# Patient Record
Sex: FEMALE | Race: WHITE | NOT HISPANIC OR LATINO | Employment: FULL TIME | URBAN - METROPOLITAN AREA
[De-identification: names, ages, dates, MRNs, and addresses within clinical notes are randomized per-mention and may not be internally consistent; named-entity substitution may affect disease eponyms.]

---

## 2018-06-21 PROBLEM — M51.369 DDD (DEGENERATIVE DISC DISEASE), LUMBAR: Status: ACTIVE | Noted: 2018-06-21

## 2018-07-27 PROBLEM — M51.369 BULGE OF LUMBAR DISC WITHOUT MYELOPATHY: Status: ACTIVE | Noted: 2018-07-27

## 2021-04-03 PROBLEM — E66.812 CLASS 2 SEVERE OBESITY WITH SERIOUS COMORBIDITY AND BODY MASS INDEX (BMI) OF 37.0 TO 37.9 IN ADULT: Status: ACTIVE | Noted: 2021-03-22

## 2021-04-03 PROBLEM — M51.369 BULGE OF LUMBAR DISC WITHOUT MYELOPATHY: Status: RESOLVED | Noted: 2018-07-27 | Resolved: 2021-04-03

## 2024-03-13 ENCOUNTER — E-VISIT (OUTPATIENT)
Dept: ADMINISTRATIVE | Facility: OTHER | Age: 49
End: 2024-03-13
Payer: COMMERCIAL

## 2024-03-14 ENCOUNTER — HOSPITAL ENCOUNTER (OUTPATIENT)
Dept: GENERAL RADIOLOGY | Facility: HOSPITAL | Age: 49
Discharge: HOME | End: 2024-03-14
Admitting: SURGERY
Payer: COMMERCIAL

## 2024-03-14 PROCEDURE — 74220 X-RAY XM ESOPHAGUS 1CNTRST: CPT | Performed by: RADIOLOGY

## 2024-03-14 PROCEDURE — 74220 X-RAY XM ESOPHAGUS 1CNTRST: CPT

## 2024-03-19 ENCOUNTER — OFFICE VISIT (OUTPATIENT)
Dept: SURGERY | Facility: CLINIC | Age: 49
End: 2024-03-19
Payer: COMMERCIAL

## 2024-03-19 VITALS — WEIGHT: 246 LBS | HEIGHT: 65 IN | BODY MASS INDEX: 40.98 KG/M2

## 2024-03-19 DIAGNOSIS — Z90.3 HISTORY OF SLEEVE GASTRECTOMY: ICD-10-CM

## 2024-03-19 DIAGNOSIS — K21.9 GASTROESOPHAGEAL REFLUX DISEASE WITHOUT ESOPHAGITIS: Primary | ICD-10-CM

## 2024-03-19 PROCEDURE — 99212 OFFICE O/P EST SF 10 MIN: CPT | Performed by: SURGERY

## 2024-03-19 RX ORDER — SERTRALINE HYDROCHLORIDE 100 MG/1
100 TABLET, FILM COATED ORAL DAILY
Qty: 30 TABLET | Refills: 2 | Status: SHIPPED | OUTPATIENT
Start: 2024-03-19

## 2024-03-20 NOTE — PROGRESS NOTES
Subjective   Briana Ugarte is a 48 y.o. female  is here today for follow-up.         Briana Ugarte is a 48 y.o. female here for follow up after EGD.  The patient was noted to have intact sleeve gastrectomy without ulceration.  Small hiatal hernia noted.  Reflux persist despite medical management.    Physical Exam  No acute distress, alert and oriented x 3  Clear to auscultation bilaterally  Abdomen soft nontender nondistended            Assessment     Diagnoses and all orders for this visit:    1. Gastroesophageal reflux disease without esophagitis (Primary)    2. History of sleeve gastrectomy      Briana Ugarte is a 48 y.o. female with history of sleeve gastrectomy and a small hiatal hernia that is present with reflux symptomatology despite medical management.  Patient has been counseled on lifestyle modification and weight loss, body mass index below 40.  The patient will attempt these measures to see if symptoms improve but if no improvement is noted patient will return to her bariatric surgeon for possible repair.

## 2024-04-01 ENCOUNTER — OFFICE VISIT (OUTPATIENT)
Dept: FAMILY MEDICINE CLINIC | Facility: CLINIC | Age: 49
End: 2024-04-01
Payer: COMMERCIAL

## 2024-04-01 VITALS
WEIGHT: 234 LBS | HEART RATE: 99 BPM | DIASTOLIC BLOOD PRESSURE: 64 MMHG | SYSTOLIC BLOOD PRESSURE: 126 MMHG | TEMPERATURE: 98.9 F | RESPIRATION RATE: 14 BRPM | HEIGHT: 65 IN | BODY MASS INDEX: 38.99 KG/M2 | OXYGEN SATURATION: 99 %

## 2024-04-01 DIAGNOSIS — K21.9 GASTROESOPHAGEAL REFLUX DISEASE WITHOUT ESOPHAGITIS: ICD-10-CM

## 2024-04-01 DIAGNOSIS — E66.01 CLASS 2 SEVERE OBESITY WITH SERIOUS COMORBIDITY AND BODY MASS INDEX (BMI) OF 39.0 TO 39.9 IN ADULT, UNSPECIFIED OBESITY TYPE: ICD-10-CM

## 2024-04-01 DIAGNOSIS — Z17.0 MALIGNANT NEOPLASM OF LEFT BREAST IN FEMALE, ESTROGEN RECEPTOR POSITIVE, UNSPECIFIED SITE OF BREAST: ICD-10-CM

## 2024-04-01 DIAGNOSIS — K76.0 NAFLD (NONALCOHOLIC FATTY LIVER DISEASE): ICD-10-CM

## 2024-04-01 DIAGNOSIS — Z00.00 HEALTHCARE MAINTENANCE: ICD-10-CM

## 2024-04-01 DIAGNOSIS — C50.912 MALIGNANT NEOPLASM OF LEFT BREAST IN FEMALE, ESTROGEN RECEPTOR POSITIVE, UNSPECIFIED SITE OF BREAST: ICD-10-CM

## 2024-04-01 DIAGNOSIS — Z90.3 HISTORY OF SLEEVE GASTRECTOMY: ICD-10-CM

## 2024-04-01 DIAGNOSIS — F41.8 ANXIETY WITH DEPRESSION: Chronic | ICD-10-CM

## 2024-04-01 DIAGNOSIS — M15.9 GENERALIZED OSTEOARTHROSIS, INVOLVING MULTIPLE SITES: ICD-10-CM

## 2024-04-01 DIAGNOSIS — M79.7 FIBROMYALGIA: ICD-10-CM

## 2024-04-01 DIAGNOSIS — J06.9 VIRAL UPPER RESPIRATORY INFECTION: ICD-10-CM

## 2024-04-01 DIAGNOSIS — I10 ESSENTIAL HYPERTENSION: Primary | Chronic | ICD-10-CM

## 2024-04-01 RX ORDER — ACETAMINOPHEN AND CODEINE PHOSPHATE 300; 30 MG/1; MG/1
1 TABLET ORAL NIGHTLY PRN
Qty: 10 TABLET | Refills: 0 | Status: SHIPPED | OUTPATIENT
Start: 2024-04-01

## 2024-04-01 NOTE — PROGRESS NOTES
Pt resting in bed comfortably. VSS. Fall precautions in place. Call light within reach. NIH is 0 throughout the shift. No further needs expressed at this time. Subjective   Briana Ugarte is a 48 y.o. female.     Chief Complaint  Flulike symptoms    History of Present Illness     Flulike Symptoms  She returns with a 3-day history of rhinorrhea, nasal congestion, sore throat, bilateral ear pain worse on the left, cough productive of yellow sputum, mild shortness of breath, mild anterior chest pain with deep breaths or coughing.  The symptoms have been associated with a decreased appetite, intermittent nausea, and chills.  She denies any other upper respiratory tract symptoms, and there is no history of any hemoptysis.  She denies any vomiting, abdominal pain, change in her bowel habits, hematochezia or melena, dysuria or hematuria, rash, or documented fever.  A coworker has been sick with similar symptoms    Gastroesophageal Reflux Disease  She continues to experience frequent heartburn with intermittent dysphagia to solids and liquids.  She continues to deny any divya vomiting or hematemesis, and there is no history of any abdominal pain, change in bowel habits, hematochezia, or melena.  She remains on omeprazole 40 twice daily, and continues to deny any NSAID use. She underwent an EGD by Dr. Clement on 6/2/2022 and was noted to have a small hiatal hernia, along with evidence of previous sleeve gastrectomy.  She underwent a reassessment with him since last here and an esophagram revealed a small sliding hiatal hernia, gastroesophageal reflux to the level of the mid esophagus, and occasional tertiary contractions.  He recommended follow-up with her bariatric surgery, and she will see GISELLE Snell on 5/22/2024.      History of Breast Cancer  She is approximately 3 years post left total mastectomy with a level 1 and 2 axillary lymph node dissection following a positive sentinel node.  3 of 14 lymph nodes were positive.  She was eventually staged as 1A mpT2 pN1a Mo ER+ DC+ HER2 +.  She was treated with adjuvant chemotherapy and radiation therapy and remains on  tamoxifen.  She continues to be followed by  oncology.  She underwent an updated MRI of the abdomen on 5/11/2023 with evidence of moderate hepatic steatosis as well as a number of liver lesions felt to either represent hemangiomas or focal nodular hyperplasia.  She underwent recent  fat grafting with a left nipple reconstruction by  plastic surgery     Recent TLH/BSO  She underwent a TLH/BSO by Dr. Cardona at  on 7/17/2023.  This was uncomplicated and pathology was reported as showing adenomyosis and leiomyomas up to 1 cm in diameter.  With the exception of intermittent hot flashes, she has had no sequela, and is happy with the results.    Migraine Headaches  She has a long history of migraine.  She remains on ajovy with botox injections and reports a continued improvement in their frequency and severity.  There has been no change in the quality nor any new associated symptoms. She continues to deny any changes in her vision, strength, or sensation.  She is using ubrelvy as needed and states she is generally functional within several hours    Chronic Low Back Pain  She underwent a right L5-S1 microlumbar discectomy by Dr. Kauffman on 7/6/2022.  This was uncomplicated, and while she continues to have low back pain, has had no further leg pain.  She continues to be followed by pain management    Fibromyalgia  She gives a long history of generalized muscle pain, difficulty sleeping, and fatigue.  There is no history of any joint stiffness, swelling, or redness and she continues to deny any rash.    Hypothyroidism  She remains on levothyroxine 50 daily.  She admits to fatigue.      Chronic Anxiety with Depression  She gives a long history of intermittent nervousness, worrying, difficulty sleeping.  When severe her symptoms have been associated with depression.  She has done better with the increased dose of escitalopram.  She continues to deny any side effects, and there is no history of any loss of interest in  activities or suicidal ideation    The following portions of the patient's history were reviewed and updated as appropriate: allergies, current medications, past medical history, past social history, and problem list.    Review of Systems   Constitutional:  Positive for chills and fatigue. Negative for fever.   HENT:  Positive for congestion, ear pain, postnasal drip, rhinorrhea and sore throat.    Eyes:  Negative for visual disturbance.   Respiratory:  Positive for cough and shortness of breath. Negative for wheezing.    Cardiovascular:  Positive for chest pain. Negative for palpitations and leg swelling.   Gastrointestinal:  Positive for nausea and GERD. Negative for abdominal pain, blood in stool, constipation, diarrhea and vomiting.        Intermittent dysphagia   Endocrine:        Hot flashes   Genitourinary:  Negative for dysuria and hematuria.   Musculoskeletal:  Positive for arthralgias, back pain, gait problem and myalgias. Negative for joint swelling.   Skin:  Negative for rash.   Neurological:  Positive for weakness (generalized), numbness and headache. Negative for dizziness.   Psychiatric/Behavioral:  Positive for sleep disturbance. Negative for suicidal ideas and depressed mood. The patient is nervous/anxious.      Objective   Physical Exam  Constitutional:       General: She is not in acute distress.     Appearance: Normal appearance. She is well-developed. She is not diaphoretic.      Comments: Alert and in fair spirits. No apparent distress. No pallor, jaundice, diaphoresis, or cyanosis.   HENT:      Head: Atraumatic.      Right Ear: Tympanic membrane, ear canal and external ear normal.      Left Ear: Tympanic membrane, ear canal and external ear normal.      Mouth/Throat:      Lips: No lesions.      Mouth: Mucous membranes are moist. No oral lesions.      Pharynx: No oropharyngeal exudate or posterior oropharyngeal erythema.   Eyes:      General: Lids are normal.      Extraocular Movements:  Extraocular movements intact.      Conjunctiva/sclera: Conjunctivae normal.      Pupils: Pupils are equal.   Neck:      Thyroid: No thyroid mass or thyromegaly.      Vascular: No carotid bruit or JVD.      Trachea: Trachea normal. No tracheal deviation.   Cardiovascular:      Rate and Rhythm: Normal rate and regular rhythm.      Heart sounds: Normal heart sounds, S1 normal and S2 normal. No murmur heard.     No gallop.   Pulmonary:      Effort: Pulmonary effort is normal.      Breath sounds: Normal breath sounds. No decreased breath sounds, wheezing, rhonchi or rales.   Abdominal:      General: Bowel sounds are normal. There is no distension.   Musculoskeletal:      Right lower leg: No edema.      Left lower leg: No edema.   Lymphadenopathy:      Head:      Right side of head: No submental, submandibular, tonsillar, preauricular, posterior auricular or occipital adenopathy.      Left side of head: No submental, submandibular, tonsillar, preauricular, posterior auricular or occipital adenopathy.      Cervical: No cervical adenopathy.      Upper Body:      Right upper body: No supraclavicular adenopathy.      Left upper body: No supraclavicular adenopathy.   Skin:     General: Skin is warm.      Coloration: Skin is not cyanotic, jaundiced or pale.      Findings: No rash.      Nails: There is no clubbing.   Neurological:      Mental Status: She is alert and oriented to person, place, and time.      Cranial Nerves: No cranial nerve deficit, dysarthria or facial asymmetry.      Sensory: No sensory deficit.      Motor: No tremor.      Coordination: Coordination normal.      Gait: Gait normal.   Psychiatric:         Attention and Perception: Attention normal.         Mood and Affect: Mood normal.         Speech: Speech normal.         Behavior: Behavior normal.         Thought Content: Thought content normal.       Assessment & Plan   Problems Addressed this Visit          Cardiac and Vasculature    Essential  hypertension   Hypertension:  BP remains at goal off medication . Evidence of target organ damage: none.  Encouraged to continue to work on diet and exercise plan.        ENT    Viral upper respiratory infection  Advised regarding symptomatic treatment.  Agreed on acetaminophen with codeine at night.  Patient will let her pain management doctor know  Patient did not feel she would start on antiviral therapy even if positive for influenza or COVID  Encouraged to report if any worse, any new symptoms, or if not resolving over the next 4 days    Relevant Medications    acetaminophen-codeine (TYLENOL with CODEINE #3) 300-30 MG per tablet       Endocrine and Metabolic    Class 2 severe obesity with serious comorbidity and body mass index (BMI) of 39.0 to 39.9 in adult    History of sleeve gastrectomy       Gastrointestinal Abdominal     Gastroesophageal reflux disease without esophagitis   Symptoms are currently  much worse  Reminded regarding lifestyle modification.  Continue current medication.  Follow up with bariatric surgery    NAFLD (nonalcoholic fatty liver disease)       Hematology and Neoplasia   Malignant neoplasm of left breast in female, estrogen receptor positive  Follow up with oncology   Mental Health   Anxiety with depression (Chronic)  Stable.  Supportive therapy.   Continue current medication.  Encouraged to report if any worse or if any new symptoms or concerns.   Musculoskeletal and Injuries   Fibromyalgia   Generalized osteoarthrosis, involving multiple sites  Reminded regarding symptomatic treatment.   Continue current medication  Follow up with pain management                                                 Diagnoses         Codes Comments    Essential hypertension    -  Primary ICD-10-CM: I10  ICD-9-CM: 401.9     History of sleeve gastrectomy     ICD-10-CM: Z90.3  ICD-9-CM: V15.29     Class 2 severe obesity with serious comorbidity and body mass index (BMI) of 39.0 to 39.9 in adult, unspecified  obesity type     ICD-10-CM: E66.01, Z68.39  ICD-9-CM: 278.01, V85.39     NAFLD (nonalcoholic fatty liver disease)     ICD-10-CM: K76.0  ICD-9-CM: 571.8     Gastroesophageal reflux disease without esophagitis     ICD-10-CM: K21.9  ICD-9-CM: 530.81     Healthcare maintenance     ICD-10-CM: Z00.00  ICD-9-CM: V70.0     Malignant neoplasm of left breast in female, estrogen receptor positive, unspecified site of breast     ICD-10-CM: C50.912, Z17.0  ICD-9-CM: 174.9, V86.0     Anxiety with depression     ICD-10-CM: F41.8  ICD-9-CM: 300.4     Generalized osteoarthrosis, involving multiple sites     ICD-10-CM: M15.9  ICD-9-CM: 715.09     Fibromyalgia     ICD-10-CM: M79.7  ICD-9-CM: 729.1     Viral upper respiratory infection     ICD-10-CM: J06.9  ICD-9-CM: 465.9

## 2024-04-05 DIAGNOSIS — N95.1 MENOPAUSAL SYMPTOM: Primary | ICD-10-CM

## 2024-04-05 RX ORDER — FEZOLINETANT 45 MG/1
45 TABLET, FILM COATED ORAL EVERY 24 HOURS
Qty: 30 TABLET | Refills: 5 | Status: SHIPPED | OUTPATIENT
Start: 2024-04-05

## 2024-04-17 ENCOUNTER — OFFICE VISIT (OUTPATIENT)
Dept: FAMILY MEDICINE CLINIC | Facility: CLINIC | Age: 49
End: 2024-04-17
Payer: COMMERCIAL

## 2024-04-17 VITALS
DIASTOLIC BLOOD PRESSURE: 80 MMHG | WEIGHT: 239 LBS | SYSTOLIC BLOOD PRESSURE: 130 MMHG | TEMPERATURE: 97 F | BODY MASS INDEX: 39.82 KG/M2 | OXYGEN SATURATION: 98 % | HEART RATE: 81 BPM | HEIGHT: 65 IN

## 2024-04-17 DIAGNOSIS — G43.909 MIGRAINE WITHOUT STATUS MIGRAINOSUS, NOT INTRACTABLE, UNSPECIFIED MIGRAINE TYPE: Primary | ICD-10-CM

## 2024-04-17 RX ORDER — KETOROLAC TROMETHAMINE 10 MG/1
10 TABLET, FILM COATED ORAL EVERY 6 HOURS PRN
Qty: 16 TABLET | Refills: 0 | Status: SHIPPED | OUTPATIENT
Start: 2024-04-17 | End: 2024-04-21

## 2024-04-17 RX ORDER — KETOROLAC TROMETHAMINE 30 MG/ML
60 INJECTION, SOLUTION INTRAMUSCULAR; INTRAVENOUS ONCE
Status: COMPLETED | OUTPATIENT
Start: 2024-04-17 | End: 2024-04-17

## 2024-04-17 RX ORDER — PROMETHAZINE HYDROCHLORIDE 12.5 MG/1
12.5 TABLET ORAL EVERY 6 HOURS PRN
Qty: 30 TABLET | Refills: 5 | Status: SHIPPED | OUTPATIENT
Start: 2024-04-17

## 2024-04-17 RX ADMIN — KETOROLAC TROMETHAMINE 60 MG: 30 INJECTION, SOLUTION INTRAMUSCULAR; INTRAVENOUS at 13:15

## 2024-04-17 NOTE — PROGRESS NOTES
Subjective        Chief Complaint  Back Pain and Headache    Subjective      Briana Ugarte is a 48 y.o. female who presents today to [facility] for Back Pain and Headache. She has a past medical history of migraine headaches, Anxiety, Breast cancer (1/24/2018), Cervical disc disorder, Cholelithiasis, Chronic back pain, Essential hypertension, Fibromyalgia, primary, GERD, Hypothyroidism, Joint pain, Kidney stones, and DDD.     Back Pain and Headache:  She reports 2 days of migraine type headache. She has these chronically and it is similar to previous episodes. She has had associated nausea. She is on monthly Ajovy injections, botox injections, and prn Ubrelvy. The Ubrelvy generally helps when she does get a break through migraine, but has not helped with this episode. She has also had a flare of her chronic back pain with known DDD.      Current Outpatient Medications:     cetirizine (zyrTEC) 10 MG tablet, Take 1 tablet by mouth Daily., Disp: 30 tablet, Rfl: 0    cyanocobalamin 1000 MCG/ML injection, ADMINISTER 1 ML IN THE MUSCLE EVERY 28 DAYS AS DIRECTED BY PRESCRIBER, Disp: 1 mL, Rfl: 5    cyclobenzaprine (FLEXERIL) 10 MG tablet, Take 1 tablet by mouth 3 (Three) Times a Day As Needed for Muscle Spasms., Disp: , Rfl:     Fezolinetant (Veozah) 45 MG tablet, Take 1 tablet by mouth Daily., Disp: 30 tablet, Rfl: 5    Fremanezumab-vfrm (Ajovy) 225 MG/1.5ML solution auto-injector, Inject 225 mg under the skin into the appropriate area as directed Every 30 (Thirty) Days., Disp: 1.5 mL, Rfl: 5    levothyroxine (SYNTHROID, LEVOTHROID) 75 MCG tablet, Take 1 tablet by mouth Daily., Disp: 30 tablet, Rfl: 5    omeprazole (priLOSEC) 40 MG capsule, Take 1 capsule by mouth 2 (Two) Times a Day., Disp: 180 capsule, Rfl: 3    oxyCODONE-acetaminophen (PERCOCET) 7.5-325 MG per tablet, Take 1 tablet by mouth Every 6 (Six) Hours As Needed for Moderate Pain ., Disp: 25 tablet, Rfl: 0     "pregabalin (LYRICA) 200 MG capsule, 3 (Three) Times a Day., Disp: , Rfl:     sertraline (ZOLOFT) 100 MG tablet, TAKE 1 TABLET BY MOUTH DAILY, Disp: 30 tablet, Rfl: 2    tamoxifen (NOLVADEX) 20 MG chemo tablet, Take 1 tablet by mouth Daily., Disp: , Rfl:     ubrogepant (Ubrelvy) 100 MG tablet, Take 1 tablet by mouth 1 (One) Time As Needed (Headache) for up to 1 dose., Disp: 8 tablet, Rfl: 5    vitamin D (ERGOCALCIFEROL) 1.25 MG (81149 UT) capsule capsule, Take 1 capsule by mouth 1 (One) Time Per Week., Disp: 4 capsule, Rfl: 5    ketorolac (TORADOL) 10 MG tablet, Take 1 tablet by mouth Every 6 (Six) Hours As Needed for Moderate Pain for up to 4 days., Disp: 16 tablet, Rfl: 0    mirtazapine (REMERON) 7.5 MG tablet, TAKE 1 TABLET BY MOUTH EVERY NIGHT, Disp: 30 tablet, Rfl: 5    promethazine (PHENERGAN) 12.5 MG tablet, Take 1 tablet by mouth Every 6 (Six) Hours As Needed for Nausea or Vomiting., Disp: 30 tablet, Rfl: 5  No current facility-administered medications for this visit.      No Known Allergies    Objective     Objective   Vital Signs:  /80   Pulse 81   Temp 97 °F (36.1 °C) (Temporal)   Ht 165.1 cm (65\")   Wt 108 kg (239 lb)   SpO2 98%   BMI 39.77 kg/m²   Estimated body mass index is 39.77 kg/m² as calculated from the following:    Height as of this encounter: 165.1 cm (65\").    Weight as of this encounter: 108 kg (239 lb).    Past Medical History:   Diagnosis Date    Anxiety     Arthritis     Breast cancer 1/24/2018    Breast mass     Cancer 07/2018    LEFT BREAST    Cervical disc disorder     Cholelithiasis 2011    Removed    Chronic back pain     Essential hypertension 06/23/2016    Fibromyalgia     Fibromyalgia, primary     GERD (gastroesophageal reflux disease)     Hypothyroidism     Joint pain     Kidney stone     Leukocytosis     Low back pain 2005    [facility]    Lumbosacral disc disease     Migraine     Morbid obesity     Neuropathy     Peripheral edema     Thoracic disc " disorder      Past Surgical History:   Procedure Laterality Date    BACK SURGERY  2022    BARIATRIC SURGERY      sleeve gastrectomy    BREAST AUGMENTATION  2022    right    BREAST AUGMENTATION  2022    right    BREAST AUGMENTATION  2022    BREAST AUGMENTATION  2022    BREAST AUGMENTATION  2022    BREAST BIOPSY  left    BREAST SURGERY      left mastectomy with reconstruction and right augmentation     SECTION      x 2    CHOLECYSTECTOMY  2011    COLONOSCOPY N/A 2022    Procedure: COLONOSCOPY;  Surgeon: Bishop Clement MD;  Location:  [facility];  Service: Gastroenterology;  Laterality: N/A;    ENDOSCOPY N/A 2022    Procedure: ESOPHAGOGASTRODUODENOSCOPY WITH ANESTHESIA;  Surgeon: Bishop Clement MD;  Location:  [facility];  Service: Gastroenterology;  Laterality: N/A;    EPIDURAL BLOCK   Spinal Block     Spinal Block    LUMBAR DISCECTOMY Right 2022    Procedure: LUMBAR DISCECTOMY L5-S1 RIGHT;  Surgeon: Tejinder Kauffman MD;  Location:  [facility];  Service: Neurosurgery;  Laterality: Right;    REDUCTION MAMMAPLASTY  2022    Right    TRIGGER POINT INJECTION  Neck and shoulders     WISDOM TOOTH EXTRACTION       Social History     Socioeconomic History    Marital status:     Number of children: 2   Tobacco Use    Smoking status: Never     Passive exposure: Never    Smokeless tobacco: Never    Tobacco comments:     Years ago I tried it.   Vaping Use    Vaping status: Never Used   Substance and Sexual Activity    Alcohol use: No    Drug use: No    Sexual activity: Not Currently     Partners: Male     Birth control/protection: Post-menopausal, Tubal ligation, Hysterectomy, Surgical     Comment: Tubal      Physical Exam  Vitals and nursing note reviewed.   Constitutional:       General: She is not in acute distress.     Appearance: She is well-developed. She is not diaphoretic.   HENT:      Head: Normocephalic and atraumatic.   Eyes:       General: No scleral icterus.        Right eye: No discharge.         Left eye: No discharge.      Conjunctiva/sclera: Conjunctivae normal.   Cardiovascular:      Rate and Rhythm: Normal rate and regular rhythm.      Heart sounds: Normal heart sounds. No murmur heard.     No friction rub. No gallop.   Pulmonary:      Effort: Pulmonary effort is normal. No respiratory distress.      Breath sounds: Normal breath sounds. No wheezing or rales.   Chest:      Chest wall: No tenderness.   Musculoskeletal:         General: Normal range of motion.      Cervical back: Normal range of motion and neck supple.   Skin:     General: Skin is warm and dry.      Coloration: Skin is not pale.      Findings: No erythema or rash.   Neurological:      Mental Status: She is alert and oriented to person, place, and time.   Psychiatric:         Behavior: Behavior normal.        Result Review :  The following data was reviewed by: GISELLE Franklin on 04/17/2024:  Hemoglobin A1C   Date Value Ref Range Status   07/05/2022 5.60 4.80 - 5.60 % Final     TSH   Date Value Ref Range Status   10/13/2023 2.790 0.270 - 4.200 uIU/mL Final     HDL Cholesterol   Date Value Ref Range Status   10/13/2023 76 (H) 40 - 60 mg/dL Final     LDL Cholesterol    Date Value Ref Range Status   10/13/2023 61 0 - 100 mg/dL Final     Triglycerides   Date Value Ref Range Status   10/13/2023 51 0 - 150 mg/dL Final     Total Cholesterol   Date Value Ref Range Status   10/09/2017 170 0 - 200 mg/dL Final     Comment:              Assessment / Plan         Assessment   Diagnoses and all orders for this visit:    1. Migraine without status migrainosus, not intractable, unspecified migraine type (Primary)  She received a dose of IM ketorolac 60mg x1 in the office today.   Follow with PO ketorolac 10mg QID PRN starting from tomorrow if headache persists or returns.   PRN phenergan sent to pharmacy.   Return to clinic if no improvement noted or if symptoms are worsening.   -      ketorolac (TORADOL) injection 60 mg  -     promethazine (PHENERGAN) 12.5 MG tablet; Take 1 tablet by mouth Every 6 (Six) Hours As Needed for Nausea or Vomiting.  Dispense: 30 tablet; Refill: 5  -     ketorolac (TORADOL) 10 MG tablet; Take 1 tablet by mouth Every 6 (Six) Hours As Needed for Moderate Pain for up to 4 days.  Dispense: 16 tablet; Refill: 0       New Medications Ordered This Visit   Medications    promethazine (PHENERGAN) 12.5 MG tablet     Sig: Take 1 tablet by mouth Every 6 (Six) Hours As Needed for Nausea or Vomiting.     Dispense:  30 tablet     Refill:  5    ketorolac (TORADOL) injection 60 mg    ketorolac (TORADOL) 10 MG tablet     Sig: Take 1 tablet by mouth Every 6 (Six) Hours As Needed for Moderate Pain for up to 4 days.     Dispense:  16 tablet     Refill:  0     Follow Up   Return for Follow up with PCP as scheduled.    Patient was given instructions and counseling regarding her condition or for health maintenance advice. Please see specific information pulled into the AVS if appropriate.       This document has been electronically signed by GISELLE Franklin   April 17, 2024 13:59 EDT    Dictated Utilizing Dragon Dictation: Part of this note may be an electronic transcription/translation of spoken language to printed text using the Dragon Dictation System.

## 2024-05-06 DIAGNOSIS — Z00.00 HEALTHCARE MAINTENANCE: ICD-10-CM

## 2024-05-06 DIAGNOSIS — C50.912 MALIGNANT NEOPLASM OF LEFT BREAST IN FEMALE, ESTROGEN RECEPTOR POSITIVE, UNSPECIFIED SITE OF BREAST: Primary | ICD-10-CM

## 2024-05-06 DIAGNOSIS — Z17.0 MALIGNANT NEOPLASM OF LEFT BREAST IN FEMALE, ESTROGEN RECEPTOR POSITIVE, UNSPECIFIED SITE OF BREAST: Primary | ICD-10-CM

## 2024-05-07 ENCOUNTER — TELEPHONE (OUTPATIENT)
Dept: FAMILY MEDICINE CLINIC | Facility: CLINIC | Age: 49
End: 2024-05-07

## 2024-05-07 NOTE — TELEPHONE ENCOUNTER
Called and spoke to patient.   Made her aware these had already been sent, but I will resend just to be sure

## 2024-05-07 NOTE — TELEPHONE ENCOUNTER
Caller: Briana Ugarte    Relationship: Self    Best call back number: [phone]    What is the medical concern/diagnosis:PT CALLED TO VERIFY IF ULTRASOUND AND MAMMOGRAM HAS BEEN FAXED OVER FAX:[phone] AT TELEPHONE NUMBER: [phone] [facility]

## 2024-05-08 DIAGNOSIS — F41.8 ANXIETY WITH DEPRESSION: Chronic | ICD-10-CM

## 2024-05-08 RX ORDER — MIRTAZAPINE 7.5 MG/1
7.5 TABLET, FILM COATED ORAL NIGHTLY
Qty: 30 TABLET | Refills: 5 | Status: SHIPPED | OUTPATIENT
Start: 2024-05-08

## 2024-05-22 ENCOUNTER — HOSPITAL ENCOUNTER (OUTPATIENT)
Dept: GENERAL RADIOLOGY | Facility: HOSPITAL | Age: 49
Discharge: HOME | End: 2024-05-22
Admitting: NURSE PRACTITIONER
Payer: COMMERCIAL

## 2024-05-22 ENCOUNTER — OFFICE VISIT (OUTPATIENT)
Dept: FAMILY MEDICINE CLINIC | Facility: CLINIC | Age: 49
End: 2024-05-22
Payer: COMMERCIAL

## 2024-05-22 VITALS
BODY MASS INDEX: 40.32 KG/M2 | OXYGEN SATURATION: 97 % | DIASTOLIC BLOOD PRESSURE: 80 MMHG | WEIGHT: 242 LBS | HEART RATE: 107 BPM | HEIGHT: 65 IN | RESPIRATION RATE: 14 BRPM | TEMPERATURE: 97.2 F | SYSTOLIC BLOOD PRESSURE: 130 MMHG

## 2024-05-22 DIAGNOSIS — M25.562 ACUTE PAIN OF LEFT KNEE: ICD-10-CM

## 2024-05-22 DIAGNOSIS — G43.009 MIGRAINE WITHOUT AURA AND WITHOUT STATUS MIGRAINOSUS, NOT INTRACTABLE: Primary | Chronic | ICD-10-CM

## 2024-05-22 PROCEDURE — 73562 X-RAY EXAM OF KNEE 3: CPT

## 2024-05-22 PROCEDURE — 96372 THER/PROPH/DIAG INJ SC/IM: CPT | Performed by: NURSE PRACTITIONER

## 2024-05-22 PROCEDURE — 99213 OFFICE O/P EST LOW 20 MIN: CPT | Performed by: NURSE PRACTITIONER

## 2024-05-22 RX ORDER — IBUPROFEN 800 MG/1
800 TABLET ORAL EVERY 8 HOURS PRN
Qty: 30 TABLET | Refills: 1 | Status: SHIPPED | OUTPATIENT
Start: 2024-05-22

## 2024-05-22 RX ORDER — LIDOCAINE 50 MG/G
1 PATCH TOPICAL EVERY 24 HOURS
Qty: 30 PATCH | Refills: 0 | Status: SHIPPED | OUTPATIENT
Start: 2024-05-22

## 2024-05-22 RX ORDER — ACETAMINOPHEN 500 MG
TABLET ORAL
Qty: 30 TABLET | Refills: 1 | Status: SHIPPED | OUTPATIENT
Start: 2024-05-22

## 2024-05-22 RX ORDER — KETOROLAC TROMETHAMINE 30 MG/ML
60 INJECTION, SOLUTION INTRAMUSCULAR; INTRAVENOUS ONCE
Status: COMPLETED | OUTPATIENT
Start: 2024-05-22 | End: 2024-05-22

## 2024-05-22 RX ADMIN — KETOROLAC TROMETHAMINE 60 MG: 30 INJECTION, SOLUTION INTRAMUSCULAR; INTRAVENOUS at 15:07

## 2024-05-22 NOTE — PROGRESS NOTES
"    History of Present Illness  Briana Ugarte is a 49 y.o. female who presents to the clinic today complaining of left knee pain which started \"a couple of days ago \".  In addition, she is complaining of a migraine which started last night.  Additional medical issues include generalized osteoarthrosis/cervical disc disorder, fibromyalgia, anxiety, hypertension and breast cancer 1/24/2018.    Knee Pain   The incident occurred 2 days ago. There was no injury mechanism. The pain is present in the left knee. The quality of the pain is described as moderate to severe pain.  She finds she can go up stairs without issues but has a great deal of difficulty going downstairs.     Migraine  She does have migraines chronically and this headache is similar to previous episodes.  She does take it monthly Ajovy injection and Ubrelvy as needed.    The following portions of the patient's history were reviewed and updated as appropriate: allergies, current medications, past family history, past medical history, past social history, past surgical history and problem list.    Review of Systems   Constitutional:  Positive for activity change. Negative for appetite change, chills, fatigue, fever and unexpected weight change.   Eyes:  Negative for visual disturbance.   Respiratory:  Negative for cough, shortness of breath and wheezing.    Cardiovascular:  Negative for chest pain, palpitations and leg swelling.   Gastrointestinal:  Negative for nausea and vomiting.   Endocrine: Negative for cold intolerance, heat intolerance, polydipsia, polyphagia and polyuria.   Musculoskeletal:  Positive for arthralgias and gait problem. Negative for joint swelling.   Skin:  Negative for color change and rash.   Neurological:  Positive for headaches. Negative for dizziness, tremors, speech difficulty, weakness and light-headedness.   Hematological:  Negative for adenopathy.   Psychiatric/Behavioral:  Negative for confusion and decreased " "concentration. The patient is not nervous/anxious.    All other systems reviewed and are negative.    Vital signs:  /80 (BP Location: Right arm, Patient Position: Sitting, Cuff Size: Adult)   Pulse 107   Temp 97.2 °F (36.2 °C) (Temporal)   Resp 14   Ht 165.1 cm (65\")   Wt 110 kg (242 lb)   SpO2 97%   BMI 40.27 kg/m²     Physical Exam  Vitals and nursing note reviewed.   Constitutional:       General: She is not in acute distress.     Appearance: She is well-developed.   HENT:      Head: Normocephalic.      Nose: Nose normal.   Eyes:      General: No scleral icterus.        Right eye: No discharge.         Left eye: No discharge.      Conjunctiva/sclera: Conjunctivae normal.   Neck:      Trachea: No tracheal tenderness.   Cardiovascular:      Rate and Rhythm: Normal rate and regular rhythm.      Heart sounds: Normal heart sounds. No murmur heard.     No friction rub.   Pulmonary:      Effort: Pulmonary effort is normal. No respiratory distress.      Breath sounds: Normal breath sounds. No wheezing or rales.   Musculoskeletal:         General: No tenderness.      Cervical back: Neck supple.      Left knee: No deformity or ecchymosis. Decreased range of motion. Tenderness present.      Comments: Patella tenderness   Lymphadenopathy:      Head:      Right side of head: No tonsillar or preauricular adenopathy.      Left side of head: No tonsillar or preauricular adenopathy.      Cervical: No cervical adenopathy.   Skin:     General: Skin is warm and dry.      Findings: No erythema or rash.   Neurological:      Mental Status: She is alert and oriented to person, place, and time.   Psychiatric:         Mood and Affect: Mood and affect normal.         Speech: Speech normal.         Behavior: Behavior is cooperative.         Thought Content: Thought content normal.         Cognition and Memory: Cognition and memory normal.     Result Review : Left knee x-ray 5/22/2024                               IMPRESSION: No " acute findings in the left knee.    Assessment & Plan     Diagnoses and all orders for this visit:    1. Migraine without aura and without status migrainosus, not intractable (Primary)  Comments:  Toradol 60 mg injection given today.  Encouraged if symptoms worsen to seek further evaluation    2. Acute pain of left knee  Comments:  Left knee x-ray ordered and results discussed with her.  Lidoderm patches, Tylenol 500 mg and ibuprofen 800 mg prescribed to be used alternately  Orders:  -     ketorolac (TORADOL) injection 60 mg  -     XR Knee 3 View Left  -     lidocaine (LIDODERM) 5 %; Place 1 patch on the skin as directed by provider Daily. Remove & Discard patch within 12 hours or as directed by MD  Dispense: 30 patch; Refill: 0  -     acetaminophen (TYLENOL) 500 MG tablet; 1-2 tablets every 6 hours as needed for pain  Dispense: 30 tablet; Refill: 1  -     ibuprofen (ADVIL,MOTRIN) 800 MG tablet; Take 1 tablet by mouth Every 8 (Eight) Hours As Needed for Mild Pain or Moderate Pain. Take with food  Dispense: 30 tablet; Refill: 1      Follow Up If symptoms worsen or do not improve  Findings and recommendations discussed with Briana. Reviewed x-ray results.  Counseled regarding supportive care measures.  Signs and symptoms of concern reviewed and if occur to seek further evaluation or if symptoms worsen or do not improve.    Briana was given instructions and counseling regarding her condition or for health maintenance advice. Please see specific information pulled into the AVS if appropriate.    This document has been electronically signed by:      Injection  Injection performed in left ventrogluteal by LAKISHA Kovacs. Patient tolerated the procedure well without complications.  05/22/24   LAKISHA Kovacs

## 2024-05-23 ENCOUNTER — PRIOR AUTHORIZATION (OUTPATIENT)
Dept: FAMILY MEDICINE CLINIC | Facility: CLINIC | Age: 49
End: 2024-05-23
Payer: COMMERCIAL

## 2024-06-13 ENCOUNTER — OFFICE VISIT (OUTPATIENT)
Dept: FAMILY MEDICINE CLINIC | Facility: CLINIC | Age: 49
End: 2024-06-13
Payer: COMMERCIAL

## 2024-06-13 VITALS
SYSTOLIC BLOOD PRESSURE: 124 MMHG | HEIGHT: 65 IN | WEIGHT: 243 LBS | BODY MASS INDEX: 40.48 KG/M2 | DIASTOLIC BLOOD PRESSURE: 64 MMHG | RESPIRATION RATE: 14 BRPM | TEMPERATURE: 97.8 F | OXYGEN SATURATION: 96 % | HEART RATE: 92 BPM

## 2024-06-13 DIAGNOSIS — Z17.0 MALIGNANT NEOPLASM OF LEFT BREAST IN FEMALE, ESTROGEN RECEPTOR POSITIVE, UNSPECIFIED SITE OF BREAST: ICD-10-CM

## 2024-06-13 DIAGNOSIS — M79.7 FIBROMYALGIA: ICD-10-CM

## 2024-06-13 DIAGNOSIS — E03.8 HYPOTHYROIDISM DUE TO HASHIMOTO'S THYROIDITIS: ICD-10-CM

## 2024-06-13 DIAGNOSIS — K59.09 CHRONIC CONSTIPATION: ICD-10-CM

## 2024-06-13 DIAGNOSIS — F41.8 ANXIETY WITH DEPRESSION: Chronic | ICD-10-CM

## 2024-06-13 DIAGNOSIS — E55.9 VITAMIN D DEFICIENCY: Chronic | ICD-10-CM

## 2024-06-13 DIAGNOSIS — Z00.00 HEALTHCARE MAINTENANCE: ICD-10-CM

## 2024-06-13 DIAGNOSIS — K76.0 NAFLD (NONALCOHOLIC FATTY LIVER DISEASE): ICD-10-CM

## 2024-06-13 DIAGNOSIS — M15.9 GENERALIZED OSTEOARTHROSIS, INVOLVING MULTIPLE SITES: ICD-10-CM

## 2024-06-13 DIAGNOSIS — K21.9 GASTROESOPHAGEAL REFLUX DISEASE WITHOUT ESOPHAGITIS: ICD-10-CM

## 2024-06-13 DIAGNOSIS — C50.912 MALIGNANT NEOPLASM OF LEFT BREAST IN FEMALE, ESTROGEN RECEPTOR POSITIVE, UNSPECIFIED SITE OF BREAST: ICD-10-CM

## 2024-06-13 DIAGNOSIS — I10 ESSENTIAL HYPERTENSION: Primary | Chronic | ICD-10-CM

## 2024-06-13 DIAGNOSIS — R79.89 LOW VITAMIN B12 LEVEL: ICD-10-CM

## 2024-06-13 DIAGNOSIS — E06.3 HYPOTHYROIDISM DUE TO HASHIMOTO'S THYROIDITIS: ICD-10-CM

## 2024-06-13 DIAGNOSIS — Z90.3 HISTORY OF SLEEVE GASTRECTOMY: ICD-10-CM

## 2024-06-13 DIAGNOSIS — E66.01 CLASS 2 SEVERE OBESITY WITH SERIOUS COMORBIDITY AND BODY MASS INDEX (BMI) OF 39.0 TO 39.9 IN ADULT, UNSPECIFIED OBESITY TYPE: ICD-10-CM

## 2024-06-13 DIAGNOSIS — M51.36 DDD (DEGENERATIVE DISC DISEASE), LUMBAR: ICD-10-CM

## 2024-06-13 DIAGNOSIS — N95.1 MENOPAUSAL SYMPTOM: ICD-10-CM

## 2024-06-13 DIAGNOSIS — G43.009 MIGRAINE WITHOUT AURA AND WITHOUT STATUS MIGRAINOSUS, NOT INTRACTABLE: ICD-10-CM

## 2024-06-13 PROBLEM — J06.9 VIRAL UPPER RESPIRATORY INFECTION: Status: RESOLVED | Noted: 2024-04-01 | Resolved: 2024-06-13

## 2024-06-13 PROCEDURE — 99214 OFFICE O/P EST MOD 30 MIN: CPT | Performed by: GENERAL PRACTICE

## 2024-06-13 RX ORDER — FEZOLINETANT 45 MG/1
45 TABLET, FILM COATED ORAL EVERY 24 HOURS
Qty: 56 TABLET | Refills: 0 | COMMUNITY
Start: 2024-06-13

## 2024-06-13 NOTE — PROGRESS NOTES
Subjective   Briana Ugarte is a 49 y.o. female.     Chief Complaint  She returns for a scheduled reassessment of multiple medical problems including chronic anxiety with depression, migraine headaches, chronic low back pain, fibromyalgia, hypothyroidism, gastroesophageal reflux disease, and previous breast cancer    History of Present Illness     Chronic Anxiety with Depression  She gives a long history of intermittent nervousness, worrying, and difficulty sleeping.  When severe her symptoms have been associated with depression.  She has done better with a change in escitalopram to sertraline.  She denies any apparent side effects, and there is no history of any loss of interest in activities or suicidal ideation    Migraine Headaches  She has a long history of migraine.  She has been unable to obtain ajovy due to cost considerations.  She remains on botox injections alone for prevention, and has noted an increase in the frequency and severity of her headaches.  There has been no change in the quality nor any new associated symptoms. She continues to deny any changes in her vision, strength, or sensation.  She is using ubrelvy as needed and states she is generally functional within several hours    Chronic Low Back Pain  She underwent a right L5-S1 microlumbar discectomy by Dr. Kauffman on 7/6/2022.  This was uncomplicated, and while she continues to have low back pain, has had no further leg pain.      Fibromyalgia  She gives a long history of generalized muscle pain, difficulty sleeping, and fatigue.  There is no history of any joint stiffness, swelling, or redness and she continues to deny any rash.    Hypothyroidism  She is currently on levothyroxine 75 daily.  She admits to fatigue.  She has had no recent labs    Gastroesophageal Reflux Disease  She reports an improvement in her heartburn and dysphagia to solids and liquids.  She continues to deny any divya vomiting or hematemesis, and there is no  history of any abdominal pain, change in bowel habits, hematochezia, or melena.  She remains on omeprazole 40 twice daily, and continues to deny any NSAID use. She underwent an EGD by Dr. Clement on 6/2/2022 and was noted to have a small hiatal hernia, along with evidence of previous sleeve gastrectomy.  She underwent a reassessment with him on 3/20/2024 and an esophagram revealed a small sliding hiatal hernia, gastroesophageal reflux to the level of the mid esophagus, and occasional tertiary contractions.      History of Breast Cancer  She is approximately 5 years post left total mastectomy with a level 1 and 2 axillary lymph node dissection following a positive sentinel node.  3 of 14 lymph nodes were positive.  She was eventually staged as 1A mpT2 pN1a Mo ER+ [place]+ HER2 +.  She was treated with adjuvant chemotherapy and radiation therapy and remains on tamoxifen.  She continues to be followed by  oncology.  She underwent an updated right mammogram and ultrasound yesterday with no significant abnormalities noted.  She is scheduled to undergo an oncology reassessment with Dr. Benson on 12/16/2024    Previous TLH/BSO  She underwent a TLH/BSO by Dr. Cardona at  on 7/17/2023.  This was uncomplicated and pathology was reported as showing adenomyosis and leiomyomas up to 1 cm in diameter.  With the exception of intermittent hot flashes, she has had no sequela.  She feels that veozah has helped some, but has not been able to take it consistently due to cost considerations    The following portions of the patient's history were reviewed and updated as appropriate: allergies, current medications, past medical history, past social history, and problem list.    Review of Systems   Constitutional:  Positive for fatigue. Negative for chills and fever.   HENT:  Negative for congestion, ear pain, rhinorrhea and sore throat.    Eyes:  Negative for visual disturbance.   Respiratory:  Negative for cough, shortness of breath and  wheezing.    Cardiovascular:  Negative for chest pain, palpitations and leg swelling.   Gastrointestinal:  Positive for GERD. Negative for abdominal pain, blood in stool, constipation, diarrhea, nausea and vomiting.        Occasional dysphagia   Endocrine:        Hot flashes   Genitourinary:  Negative for dysuria and hematuria.   Musculoskeletal:  Positive for arthralgias, back pain, gait problem and myalgias. Negative for joint swelling.   Skin:  Negative for rash.   Neurological:  Positive for weakness (generalized), numbness and headache. Negative for dizziness.   Psychiatric/Behavioral:  Positive for sleep disturbance. Negative for suicidal ideas and depressed mood. The patient is nervous/anxious.      Objective   Physical Exam  Constitutional:       General: She is not in acute distress.     Appearance: Normal appearance. She is well-developed. She is not diaphoretic.      Comments: Bright and in good spirits. No apparent distress. No pallor, jaundice, diaphoresis, or cyanosis   HENT:      Head: Atraumatic.      Right Ear: Tympanic membrane, ear canal and external ear normal.      Left Ear: Tympanic membrane, ear canal and external ear normal.      Mouth/Throat:      Lips: No lesions.      Mouth: Mucous membranes are moist. No oral lesions.      Pharynx: No oropharyngeal exudate or posterior oropharyngeal erythema.   Eyes:      General: Lids are normal.      Extraocular Movements: Extraocular movements intact.      Conjunctiva/sclera: Conjunctivae normal.      Pupils: Pupils are equal.   Neck:      Thyroid: No thyroid mass or thyromegaly.      Vascular: No carotid bruit or JVD.      Trachea: Trachea normal. No tracheal deviation.   Cardiovascular:      Rate and Rhythm: Normal rate and regular rhythm.      Heart sounds: Normal heart sounds, S1 normal and S2 normal. No murmur heard.     No gallop.   Pulmonary:      Effort: Pulmonary effort is normal.      Breath sounds: Normal breath sounds. No decreased breath  sounds, wheezing, rhonchi or rales.   Abdominal:      General: Bowel sounds are normal. There is no distension.   Musculoskeletal:      Right lower leg: No edema.      Left lower leg: No edema.   Lymphadenopathy:      Head:      Right side of head: No submental, submandibular, tonsillar, preauricular, posterior auricular or occipital adenopathy.      Left side of head: No submental, submandibular, tonsillar, preauricular, posterior auricular or occipital adenopathy.      Cervical: No cervical adenopathy.      Upper Body:      Right upper body: No supraclavicular adenopathy.      Left upper body: No supraclavicular adenopathy.   Skin:     General: Skin is warm.      Coloration: Skin is not cyanotic, jaundiced or pale.      Findings: No rash.      Nails: There is no clubbing.   Neurological:      Mental Status: She is alert and oriented to person, place, and time.      Cranial Nerves: No cranial nerve deficit, dysarthria or facial asymmetry.      Sensory: No sensory deficit.      Motor: No tremor.      Coordination: Coordination normal.      Gait: Gait normal.   Psychiatric:         Attention and Perception: Attention normal.         Mood and Affect: Mood normal.         Speech: Speech normal.         Behavior: Behavior normal.         Thought Content: Thought content normal.       Assessment & Plan   Problems Addressed this Visit          Cardiac and Vasculature    Essential hypertension    Hypertension:  BP remains at goal off medication . Evidence of target organ damage: none.  Encouraged to continue to work on diet and exercise plan.     Relevant Orders    CBC & Differential    Comprehensive Metabolic Panel    Lipid Panel       Endocrine and Metabolic    Vitamin D deficiency (Chronic)  Continue supplementation with monitoring.    Relevant Orders    Vitamin D,25-Hydroxy    Class 2 severe obesity with serious comorbidity and body mass index (BMI) of 39.0 to 39.9 in adult    History of sleeve gastrectomy     Hypothyroidism due to Hashimoto's thyroiditis  Clinically euthyroid.  Continue current medication.  Scheduled for updated labs    Relevant Orders    TSH    Low vitamin B12 level  Continue supplementation with monitoring.    Relevant Orders    CBC & Differential    Vitamin B12       Gastrointestinal Abdominal     Chronic constipation  Reminded regarding lifestyle modification  Encouraged to report if any worse or if any new symptoms or concerns.    Relevant Orders    CBC & Differential    Gastroesophageal reflux disease without esophagitis  As above.  Continue current medication    Relevant Orders    CBC & Differential    NAFLD (nonalcoholic fatty liver disease)  Will continue to monitor    Relevant Orders    Comprehensive Metabolic Panel    Lipid Panel       Health Encounters    Healthcare maintenance  Recommended an updated Tdap  Reminded to get a flu shot when available.       Hematology and Neoplasia    Malignant neoplasm of left breast in female, estrogen receptor positive  Continue current medication  Follow up with oncology    Relevant Orders    CBC & Differential       Mental Health    Anxiety with depression (Chronic)  Stable.  Supportive therapy.   Continue current medication.  Encouraged to report if any worse or if any new symptoms or concerns.       Musculoskeletal and Injuries    Fibromyalgia    Generalized osteoarthrosis, involving multiple sites  Reminded regarding symptomatic treatment.   Continue current medication  Encouraged to report if any worse or if any new symptoms or concerns.       Neuro    DDD (degenerative disc disease), lumbar    Migraine without aura and without status migrainosus, not intractable  Continue current medication  Follow up with neurology     Relevant Orders    CBC & Differential     Other Visit Diagnoses       Menopausal symptom      Continue current medication  Given samples of veozah    Relevant Medications    Fezolinetant (Veozah) 45 MG tablet          Diagnoses          Codes Comments    Essential hypertension    -  Primary ICD-10-CM: I10  ICD-9-CM: 401.9     Vitamin D deficiency     ICD-10-CM: E55.9  ICD-9-CM: 268.9     Low vitamin B12 level     ICD-10-CM: R79.89  ICD-9-CM: 790.6     History of sleeve gastrectomy     ICD-10-CM: Z90.3  ICD-9-CM: V15.29     Hypothyroidism due to Hashimoto's thyroiditis     ICD-10-CM: E03.8, E06.3  ICD-9-CM: 244.8, 245.2     Class 2 severe obesity with serious comorbidity and body mass index (BMI) of 39.0 to 39.9 in adult, unspecified obesity type     ICD-10-CM: E66.01, Z68.39  ICD-9-CM: 278.01, V85.39     NAFLD (nonalcoholic fatty liver disease)     ICD-10-CM: K76.0  ICD-9-CM: 571.8     Gastroesophageal reflux disease without esophagitis     ICD-10-CM: K21.9  ICD-9-CM: 530.81     Chronic constipation     ICD-10-CM: K59.09  ICD-9-CM: 564.00     Healthcare maintenance     ICD-10-CM: Z00.00  ICD-9-CM: V70.0     Malignant neoplasm of left breast in female, estrogen receptor positive, unspecified site of breast     ICD-10-CM: C50.912, Z17.0  ICD-9-CM: 174.9, V86.0     Generalized osteoarthrosis, involving multiple sites     ICD-10-CM: M15.9  ICD-9-CM: 715.09     Fibromyalgia     ICD-10-CM: M79.7  ICD-9-CM: 729.1     Anxiety with depression     ICD-10-CM: F41.8  ICD-9-CM: 300.4     Migraine without aura and without status migrainosus, not intractable     ICD-10-CM: G43.009  ICD-9-CM: 346.10     DDD (degenerative disc disease), lumbar     ICD-10-CM: M51.36  ICD-9-CM: 722.52     Menopausal symptom     ICD-10-CM: N95.1  ICD-9-CM: 627.2

## 2024-06-21 RX ORDER — SERTRALINE HYDROCHLORIDE 100 MG/1
100 TABLET, FILM COATED ORAL DAILY
Qty: 30 TABLET | Refills: 5 | Status: SHIPPED | OUTPATIENT
Start: 2024-06-21

## 2024-06-22 DIAGNOSIS — Z90.3 HISTORY OF SLEEVE GASTRECTOMY: Primary | ICD-10-CM

## 2024-06-22 DIAGNOSIS — E65 ABDOMINAL PANNICULUS: ICD-10-CM

## 2024-06-24 ENCOUNTER — TELEPHONE (OUTPATIENT)
Dept: FAMILY MEDICINE CLINIC | Facility: CLINIC | Age: 49
End: 2024-06-24
Payer: COMMERCIAL

## 2024-06-24 NOTE — TELEPHONE ENCOUNTER
----- Message from Howard Arboleda sent at 6/24/2024  4:41 PM EDT -----  Regarding: FW: Issue  Contact: [phone]  Please work in with martykenyetta tomorrow  Thanks  ----- Message -----  From: Briana Ugarte  Sent: 6/24/2024   4:34 PM EDT  To: Howard Arboleda MD  Subject: Issue                                            Yes! Any time will work

## 2024-06-24 NOTE — TELEPHONE ENCOUNTER
Caller: Briana Ugarte    Relationship: Self    Best call back number: [phone]    What is the best time to reach you: AS SOON AS    Who are you requesting to speak with (clinical staff, provider,  specific staff member): CLINICAL STAFF    What was the call regarding: PATIENT HAS A HIATAL HERNIA AND HAS BEEN EXPERIENCING A LOT OF CHOKING SENSATION AND DISCOMFORT. IS THERE ANYTHING SHE CAN TAKE OR DO TO HELP?

## 2024-06-25 ENCOUNTER — OFFICE VISIT (OUTPATIENT)
Dept: FAMILY MEDICINE CLINIC | Facility: CLINIC | Age: 49
End: 2024-06-25
Payer: COMMERCIAL

## 2024-06-25 VITALS
OXYGEN SATURATION: 99 % | TEMPERATURE: 97.2 F | SYSTOLIC BLOOD PRESSURE: 110 MMHG | RESPIRATION RATE: 14 BRPM | HEIGHT: 65 IN | DIASTOLIC BLOOD PRESSURE: 80 MMHG | WEIGHT: 238.1 LBS | HEART RATE: 103 BPM | BODY MASS INDEX: 39.67 KG/M2

## 2024-06-25 DIAGNOSIS — E55.9 VITAMIN D DEFICIENCY: Chronic | ICD-10-CM

## 2024-06-25 DIAGNOSIS — R79.89 LOW VITAMIN B12 LEVEL: Chronic | ICD-10-CM

## 2024-06-25 DIAGNOSIS — C50.912 MALIGNANT NEOPLASM OF LEFT BREAST IN FEMALE, ESTROGEN RECEPTOR POSITIVE, UNSPECIFIED SITE OF BREAST: Chronic | ICD-10-CM

## 2024-06-25 DIAGNOSIS — K21.9 GASTROESOPHAGEAL REFLUX DISEASE WITHOUT ESOPHAGITIS: Chronic | ICD-10-CM

## 2024-06-25 DIAGNOSIS — E06.3 HYPOTHYROIDISM DUE TO HASHIMOTO'S THYROIDITIS: Chronic | ICD-10-CM

## 2024-06-25 DIAGNOSIS — R11.0 NAUSEA: ICD-10-CM

## 2024-06-25 DIAGNOSIS — I10 ESSENTIAL HYPERTENSION: Chronic | ICD-10-CM

## 2024-06-25 DIAGNOSIS — E03.8 HYPOTHYROIDISM DUE TO HASHIMOTO'S THYROIDITIS: Chronic | ICD-10-CM

## 2024-06-25 DIAGNOSIS — R34 DECREASED URINE OUTPUT: ICD-10-CM

## 2024-06-25 DIAGNOSIS — R19.8 GI SYMPTOMS: Primary | ICD-10-CM

## 2024-06-25 DIAGNOSIS — Z17.0 MALIGNANT NEOPLASM OF LEFT BREAST IN FEMALE, ESTROGEN RECEPTOR POSITIVE, UNSPECIFIED SITE OF BREAST: Chronic | ICD-10-CM

## 2024-06-25 LAB
25(OH)D3 SERPL-MCNC: 29.2 NG/ML (ref 30–100)
ALBUMIN SERPL-MCNC: 4.1 G/DL (ref 3.5–5.2)
ALBUMIN/GLOB SERPL: 1.3 G/DL
ALP SERPL-CCNC: 89 U/L (ref 39–117)
ALT SERPL W P-5'-P-CCNC: 9 U/L (ref 1–33)
ANION GAP SERPL CALCULATED.3IONS-SCNC: 13 MMOL/L (ref 5–15)
AST SERPL-CCNC: 17 U/L (ref 1–32)
BASOPHILS # BLD AUTO: 0.07 10*3/MM3 (ref 0–0.2)
BASOPHILS NFR BLD AUTO: 0.7 % (ref 0–1.5)
BILIRUB BLD-MCNC: ABNORMAL MG/DL
BILIRUB SERPL-MCNC: 0.6 MG/DL (ref 0–1.2)
BUN SERPL-MCNC: 13 MG/DL (ref 6–20)
BUN/CREAT SERPL: 13.3 (ref 7–25)
CALCIUM SPEC-SCNC: 9.4 MG/DL (ref 8.6–10.5)
CHLORIDE SERPL-SCNC: 103 MMOL/L (ref 98–107)
CHOLEST SERPL-MCNC: 191 MG/DL (ref 0–200)
CLARITY, POC: CLEAR
CO2 SERPL-SCNC: 26 MMOL/L (ref 22–29)
COLOR UR: YELLOW
CREAT SERPL-MCNC: 0.98 MG/DL (ref 0.57–1)
DEPRECATED RDW RBC AUTO: 40 FL (ref 37–54)
EGFRCR SERPLBLD CKD-EPI 2021: 71.8 ML/MIN/1.73
EOSINOPHIL # BLD AUTO: 0.25 10*3/MM3 (ref 0–0.4)
EOSINOPHIL NFR BLD AUTO: 2.7 % (ref 0.3–6.2)
ERYTHROCYTE [DISTWIDTH] IN BLOOD BY AUTOMATED COUNT: 13.5 % (ref 12.3–15.4)
EXPIRATION DATE: ABNORMAL
GLOBULIN UR ELPH-MCNC: 3.2 GM/DL
GLUCOSE SERPL-MCNC: 88 MG/DL (ref 65–99)
GLUCOSE UR STRIP-MCNC: NEGATIVE MG/DL
HCT VFR BLD AUTO: 41 % (ref 34–46.6)
HDLC SERPL-MCNC: 85 MG/DL (ref 40–60)
HGB BLD-MCNC: 13.2 G/DL (ref 12–15.9)
IMM GRANULOCYTES # BLD AUTO: 0.04 10*3/MM3 (ref 0–0.05)
IMM GRANULOCYTES NFR BLD AUTO: 0.4 % (ref 0–0.5)
KETONES UR QL: ABNORMAL
LDLC SERPL CALC-MCNC: 93 MG/DL (ref 0–100)
LDLC/HDLC SERPL: 1.08 {RATIO}
LEUKOCYTE EST, POC: NEGATIVE
LYMPHOCYTES # BLD AUTO: 2.38 10*3/MM3 (ref 0.7–3.1)
LYMPHOCYTES NFR BLD AUTO: 25.4 % (ref 19.6–45.3)
Lab: ABNORMAL
MCH RBC QN AUTO: 26.7 PG (ref 26.6–33)
MCHC RBC AUTO-ENTMCNC: 32.2 G/DL (ref 31.5–35.7)
MCV RBC AUTO: 82.8 FL (ref 79–97)
MONOCYTES # BLD AUTO: 0.52 10*3/MM3 (ref 0.1–0.9)
MONOCYTES NFR BLD AUTO: 5.6 % (ref 5–12)
NEUTROPHILS NFR BLD AUTO: 6.1 10*3/MM3 (ref 1.7–7)
NEUTROPHILS NFR BLD AUTO: 65.2 % (ref 42.7–76)
NITRITE UR-MCNC: NEGATIVE MG/ML
NRBC BLD AUTO-RTO: 0 /100 WBC (ref 0–0.2)
PH UR: 6 [PH] (ref 5–8)
PLATELET # BLD AUTO: 373 10*3/MM3 (ref 140–450)
PMV BLD AUTO: 9.9 FL (ref 6–12)
POTASSIUM SERPL-SCNC: 4.2 MMOL/L (ref 3.5–5.2)
PROT SERPL-MCNC: 7.3 G/DL (ref 6–8.5)
PROT UR STRIP-MCNC: ABNORMAL MG/DL
RBC # BLD AUTO: 4.95 10*6/MM3 (ref 3.77–5.28)
RBC # UR STRIP: NEGATIVE /UL
SODIUM SERPL-SCNC: 142 MMOL/L (ref 136–145)
SP GR UR: 1.03 (ref 1–1.03)
TRIGL SERPL-MCNC: 70 MG/DL (ref 0–150)
TSH SERPL DL<=0.05 MIU/L-ACNC: 3.14 UIU/ML (ref 0.27–4.2)
UROBILINOGEN UR QL: ABNORMAL
VIT B12 BLD-MCNC: 541 PG/ML (ref 211–946)
VLDLC SERPL-MCNC: 13 MG/DL (ref 5–40)
WBC NRBC COR # BLD AUTO: 9.36 10*3/MM3 (ref 3.4–10.8)

## 2024-06-25 PROCEDURE — 82306 VITAMIN D 25 HYDROXY: CPT | Performed by: GENERAL PRACTICE

## 2024-06-25 PROCEDURE — 80061 LIPID PANEL: CPT | Performed by: GENERAL PRACTICE

## 2024-06-25 PROCEDURE — 82607 VITAMIN B-12: CPT | Performed by: GENERAL PRACTICE

## 2024-06-25 PROCEDURE — 80050 GENERAL HEALTH PANEL: CPT | Performed by: GENERAL PRACTICE

## 2024-06-25 PROCEDURE — 99215 OFFICE O/P EST HI 40 MIN: CPT | Performed by: NURSE PRACTITIONER

## 2024-06-25 PROCEDURE — 81003 URINALYSIS AUTO W/O SCOPE: CPT | Performed by: NURSE PRACTITIONER

## 2024-06-25 RX ORDER — DICYCLOMINE HCL 20 MG
20 TABLET ORAL EVERY 6 HOURS PRN
Qty: 50 TABLET | Refills: 1 | Status: SHIPPED | OUTPATIENT
Start: 2024-06-25

## 2024-06-25 RX ORDER — ONDANSETRON 4 MG/1
4 TABLET, ORALLY DISINTEGRATING ORAL EVERY 8 HOURS PRN
Qty: 20 TABLET | Refills: 1 | Status: SHIPPED | OUTPATIENT
Start: 2024-06-25

## 2024-06-25 NOTE — PROGRESS NOTES
"      History of Present Illness  Briana Ugarte is a 49 y.o. female who presents to the clinic today c/o \"a choking sensation when I swallow\". Her symptoms started months ago but over the last two weeks have significantly worsened to the point, she can only eat a couple of bites of food and small amounts of hydration.  She does have labs ordered per her PCP, Dr. Tenorio, which will be done today.    Choking/Dysphagia  The current episode started more than 1 month ago. The problem occurs constantly. The problem has been gradually worsening. Associated symptoms include abdominal pain (Epigastric), fatigue, headaches, nausea and weakness. She is prescribed Ibuprofen but has not taken any recently.     Heartburn  She complains of abdominal pain (Epigastric), choking, heartburn and nausea. She reports no coughing, no sore throat, no stridor, no water brash or no wheezing. The problem occurs constantly. The problem has been rapidly worsening. The heartburn is located in the substernum. Associated symptoms include fatigue. She has tried a PPI for the symptoms.     The following portions of the patient's history were reviewed and updated as appropriate: allergies, current medications, past family history, past medical history, past social history, past surgical history and problem list.    Review of Systems   Constitutional:  Positive for appetite change and fatigue. Negative for activity change, chills and fever.   HENT:  Positive for trouble swallowing. Negative for congestion, postnasal drip, sinus pressure, sinus pain and sore throat.    Respiratory:  Positive for choking. Negative for cough, shortness of breath and wheezing.    Gastrointestinal:  Positive for abdominal pain (Epigastric), diarrhea and nausea. Negative for vomiting.   Genitourinary:  Positive for decreased urine volume. Negative for dysuria and frequency.   Skin:  Negative for color change and rash.   Neurological:  Positive for weakness and " "headaches. Negative for light-headedness.   Hematological:  Negative for adenopathy.     Vital signs:  /80 (BP Location: Left arm, Patient Position: Sitting, Cuff Size: Adult)   Pulse 103   Temp 97.2 °F (36.2 °C) (Temporal)   Resp 14   Ht 165.1 cm (65\")   Wt 108 kg (238 lb 1.6 oz)   SpO2 99%   BMI 39.62 kg/m²     Physical Exam  Vitals and nursing note reviewed.   Constitutional:       General: She is not in acute distress.     Appearance: She is well-developed.   HENT:      Head: Normocephalic.      Nose: Nose normal.      Mouth/Throat:      Mouth: Mucous membranes are moist.   Eyes:      General: No scleral icterus.        Right eye: No discharge.         Left eye: No discharge.      Conjunctiva/sclera: Conjunctivae normal.   Cardiovascular:      Rate and Rhythm: Normal rate and regular rhythm.      Heart sounds: Normal heart sounds.   Pulmonary:      Effort: Pulmonary effort is normal. No respiratory distress.      Breath sounds: Normal breath sounds. No decreased breath sounds, wheezing, rhonchi or rales.   Abdominal:      General: Bowel sounds are normal.      Palpations: Abdomen is soft.      Tenderness:  in the epigastric area There is no right CVA tenderness or left CVA tenderness.   Musculoskeletal:         General: No tenderness.      Cervical back: Neck supple.   Lymphadenopathy:      Cervical: No cervical adenopathy.   Skin:     General: Skin is warm and dry.      Capillary Refill: Capillary refill takes less than 2 seconds.   Neurological:      Mental Status: She is alert and oriented to person, place, and time.   Psychiatric:         Mood and Affect: Mood and affect normal.         Speech: Speech normal.         Behavior: Behavior is cooperative.         Thought Content: Thought content normal.         Cognition and Memory: Cognition and memory normal.     Result Review :   Results for orders placed or performed in visit on 06/25/24   POC Urinalysis Dipstick, Automated    Specimen: Urine "   Result Value Ref Range    Color Yellow Yellow, Straw, Dark Yellow, Mica    Clarity, UA Clear Clear    Specific Gravity  1.030 1.005 - 1.030    pH, Urine 6.0 5.0 - 8.0    Leukocytes Negative Negative    Nitrite, UA Negative Negative    Protein, POC Trace (A) Negative mg/dL    Glucose, UA Negative Negative mg/dL    Ketones, UA Trace (A) Negative    Urobilinogen, UA 0.2 E.U./dL Normal, 0.2 E.U./dL    Bilirubin Small (1+) (A) Negative    Blood, UA Negative Negative    Lot Number [id number]     Expiration Date 10-25-24        Assessment & Plan     Diagnoses and all orders for this visit:    1. GI symptoms (Primary)  Comments:  Findings and recommendations discussed with Briana.  Consulted Dr. Tenorio who recommended a trial of dicyclomine 20 mg every 6 hours if needed.  Orders:  -     dicyclomine (BENTYL) 20 MG tablet; Take 1 tablet by mouth Every 6 (Six) Hours As Needed for Abdominal Cramping (choking symptoms).  Dispense: 50 tablet; Refill: 1    2. Gastroesophageal reflux disease without esophagitis  Comments:  Continue omeprazole 40 mg twice daily.  Referral to gastroenterology.  Orders:  -     CBC & Differential    3. Nausea  Comments:  Zofran 4 mg sublingual every 6-8 hours as needed for nausea.  Counseled regarding hydration  Orders:  -     ondansetron ODT (ZOFRAN-ODT) 4 MG disintegrating tablet; Place 1 tablet on the tongue Every 8 (Eight) Hours As Needed for Nausea or Vomiting.  Dispense: 20 tablet; Refill: 1    4. Decreased urine output  Comments:  Urinalysis completed and discussed with her.  Encouraged gentle hydration.  Signs and symptoms of concern reviewed and if occur to seek further evaluation  Orders:  -     POC Urinalysis Dipstick, Automated    5. Essential hypertension  Comments:  Well-controlled with cardiovascular risk reduction modifications  Orders:  -     Comprehensive Metabolic Panel  -     Lipid Panel  -     TSH    6. Hypothyroidism due to Hashimoto's thyroiditis  Comments:  Continue  levothyroxine 75 mcg.  TSH completed today  Orders:  -     TSH    7. Malignant neoplasm of left breast in female, estrogen receptor positive, unspecified site of breast  Comments:  Continue tomocifen.  Follow-up with oncology  Orders:  -     CBC & Differential    8. Low vitamin B12 level  Comments:  B12 level completed today  Orders:  -     Vitamin B12    9. Vitamin D deficiency  Comments:  Continue vitamin D every 7 days  Orders:  -     Vitamin D,25-Hydroxy    I spent 55 minutes caring for Briana on this date of service. This time includes time spent by me in the following activities:preparing for the visit, reviewing tests, obtaining and/or reviewing a separately obtained history, performing a medically appropriate examination and/or evaluation , counseling and educating the patient/family/caregiver, ordering medications, tests, or procedures, referring and communicating with other health care professionals , documenting information in the medical record, independently interpreting results and communicating that information with the patient/family/caregiver, and care coordination  Follow Up If symptoms worsen or do not improve  Findings and recommendations discussed with Briana. Reviewed test results. Lifestyle modifications reinforced including nutrition and activity recommendations. Will follow up in months sooner if problems/concerns occur.Briana was given instructions and counseling regarding her condition or for health maintenance advice. Please see specific information pulled into the AVS if appropriate.      This document has been electronically signed by:

## 2024-06-27 ENCOUNTER — OFFICE VISIT (OUTPATIENT)
Dept: GASTROENTEROLOGY | Facility: CLINIC | Age: 49
End: 2024-06-27
Payer: COMMERCIAL

## 2024-06-27 VITALS
OXYGEN SATURATION: 97 % | SYSTOLIC BLOOD PRESSURE: 126 MMHG | DIASTOLIC BLOOD PRESSURE: 88 MMHG | BODY MASS INDEX: 39.55 KG/M2 | HEART RATE: 95 BPM | HEIGHT: 65 IN | WEIGHT: 237.4 LBS

## 2024-06-27 DIAGNOSIS — K21.9 GASTROESOPHAGEAL REFLUX DISEASE, UNSPECIFIED WHETHER ESOPHAGITIS PRESENT: ICD-10-CM

## 2024-06-27 DIAGNOSIS — R13.19 ESOPHAGEAL DYSPHAGIA: Primary | ICD-10-CM

## 2024-06-27 RX ORDER — PANTOPRAZOLE SODIUM 40 MG/1
40 TABLET, DELAYED RELEASE ORAL
Qty: 60 TABLET | Refills: 5 | Status: SHIPPED | OUTPATIENT
Start: 2024-06-27

## 2024-06-27 NOTE — PROGRESS NOTES
"Date of Consultation:  6/27/2024  Referring Physician: BLAYNE Foster*    Chief Complaint  Difficulty Swallowing (New Patient - Dysphagia ) and Heartburn    Briana Ugarte is a 49 y.o. female who presents today to [facility] for Difficulty Swallowing (New Patient - Dysphagia ) and Heartburn.    HPI:   49-year-old female with PMH of gastric sleeve and GERD who presents today with difficulty swallowing.  Patient states that this has been intermittent for the last 6 months.  She has been evaluated by Dr. Clement in the past.  Patient had EGD and colonoscopy performed by Dr. Clement in June 2022.  EGD was notable for small hiatal hernia, sleeve gastrectomy was found, no other abnormalities noted.  Colonoscopy was largely unremarkable and repeat was recommended in 10 years.  Patient had an esophagram performed in February 2024 that was notable for occasional esophageal tertiary contractions without dysmotility, small sliding hiatal hernia, and GERD reflux to the level of mid thoracic esophagus.  Since, she has been on omeprazole 40 mg twice daily.  Reports that this initially helped.  However, this week she has been unable to eat or drink anything.  She states that it feels like \"I spasm from the top of my esophagus to the top of my stomach.\"  States that the spasms are initiated with liquids and with foods.  She is able to tolerate her own secretions.  She denies food globulus sensation.  She has felt nauseated but has not vomited.  She denies unintentional weight loss, fever, chills, vomiting, hematemesis, melena, hematochezia, change in bowel habits.        Previous History:   Past Medical History:   Diagnosis Date    Anxiety     Arthritis     Breast cancer 1/24/2018    Breast mass     Cancer 07/2018    LEFT BREAST    Cervical disc disorder     Cholelithiasis 2011    Removed    Chronic back pain     Essential hypertension 06/23/2016    Fibromyalgia     " Fibromyalgia, primary     GERD (gastroesophageal reflux disease)     Hypothyroidism     Joint pain     Kidney stone     Leukocytosis     Low back pain     [facility]    Lumbosacral disc disease     Migraine     Morbid obesity     Neuropathy     Peripheral edema     Thoracic disc disorder       Past Surgical History:   Procedure Laterality Date    BACK SURGERY  2022    BARIATRIC SURGERY      sleeve gastrectomy    BREAST AUGMENTATION  2022    right    BREAST AUGMENTATION  2022    right    BREAST AUGMENTATION  2022    BREAST AUGMENTATION  2022    BREAST AUGMENTATION  2022    BREAST BIOPSY  left    BREAST SURGERY      left mastectomy with reconstruction and right augmentation     SECTION      x 2    CHOLECYSTECTOMY      COLONOSCOPY N/A 2022    Procedure: COLONOSCOPY;  Surgeon: Bishop Clement MD;  Location: [facility] OR;  Service: Gastroenterology;  Laterality: N/A;    ENDOSCOPY N/A 2022    Procedure: ESOPHAGOGASTRODUODENOSCOPY WITH ANESTHESIA;  Surgeon: Bishop Clement MD;  Location:  [facility] OR;  Service: Gastroenterology;  Laterality: N/A;    EPIDURAL BLOCK   Spinal Block     Spinal Block    LUMBAR DISCECTOMY Right 2022    Procedure: LUMBAR DISCECTOMY L5-S1 RIGHT;  Surgeon: Tejinder Kauffman MD;  Location:  [facility] OR;  Service: Neurosurgery;  Laterality: Right;    REDUCTION MAMMAPLASTY  2022    Right    TRIGGER POINT INJECTION  Neck and shoulders     WISDOM TOOTH EXTRACTION        Social History     Socioeconomic History    Marital status:     Number of children: 2   Tobacco Use    Smoking status: Never     Passive exposure: Never    Smokeless tobacco: Never    Tobacco comments:     Years ago I tried it.   Vaping Use    Vaping status: Never Used   Substance and Sexual Activity    Alcohol use: No    Drug use: No    Sexual activity: Not Currently     Partners: Male     Birth control/protection: Post-menopausal,  Tubal ligation, Hysterectomy, Surgical     Comment: Tubal        Current Medications:  Current Outpatient Medications   Medication Sig Dispense Refill    acetaminophen (TYLENOL) 500 MG tablet 1-2 tablets every 6 hours as needed for pain 30 tablet 1    cyanocobalamin 1000 MCG/ML injection ADMINISTER 1 ML IN THE MUSCLE EVERY 28 DAYS AS DIRECTED BY PRESCRIBER 1 mL 5    cyclobenzaprine (FLEXERIL) 10 MG tablet Take 1 tablet by mouth 3 (Three) Times a Day As Needed for Muscle Spasms.      dicyclomine (BENTYL) 20 MG tablet Take 1 tablet by mouth Every 6 (Six) Hours As Needed for Abdominal Cramping (choking symptoms). 50 tablet 1    Fezolinetant (Veozah) 45 MG tablet Take 1 tablet by mouth Daily. 56 tablet 0    ibuprofen (ADVIL,MOTRIN) 800 MG tablet Take 1 tablet by mouth Every 8 (Eight) Hours As Needed for Mild Pain or Moderate Pain. Take with food 30 tablet 1    levothyroxine (SYNTHROID, LEVOTHROID) 75 MCG tablet Take 1 tablet by mouth Daily. 30 tablet 5    ondansetron ODT (ZOFRAN-ODT) 4 MG disintegrating tablet Place 1 tablet on the tongue Every 8 (Eight) Hours As Needed for Nausea or Vomiting. 20 tablet 1    oxyCODONE-acetaminophen (PERCOCET) 7.5-325 MG per tablet Take 1 tablet by mouth Every 6 (Six) Hours As Needed for Moderate Pain . 25 tablet 0    pregabalin (LYRICA) 200 MG capsule 3 (Three) Times a Day.      sertraline (ZOLOFT) 100 MG tablet Take 1 tablet by mouth Daily. 30 tablet 5    tamoxifen (NOLVADEX) 20 MG chemo tablet Take 1 tablet by mouth Daily.      ubrogepant (Ubrelvy) 100 MG tablet Take 1 tablet by mouth 1 (One) Time As Needed (Headache) for up to 1 dose. 8 tablet 5    vitamin D (ERGOCALCIFEROL) 1.25 MG (51119 UT) capsule capsule Take 1 capsule by mouth 1 (One) Time Per Week. 4 capsule 5    pantoprazole (Protonix) 40 MG EC tablet Take 1 tablet by mouth 2 (Two) Times a Day Before Meals. 60 tablet 5     No current facility-administered medications for this visit.       Allergies:   No Known  "Allergies    Vitals:   /88 (BP Location: Left arm, Patient Position: Sitting, Cuff Size: Adult)   Pulse 95   Ht 165.1 cm (65\")   Wt 108 kg (237 lb 6.4 oz)   SpO2 97%   BMI 39.51 kg/m²   Estimated body mass index is 39.51 kg/m² as calculated from the following:    Height as of this encounter: 165.1 cm (65\").    Weight as of this encounter: 108 kg (237 lb 6.4 oz).    Briana Ugarte  reports that she has never smoked. She has never been exposed to tobacco smoke. She has never used smokeless tobacco. I have educated her on the risk of diseases from using tobacco products such as cancer, COPD, and heart disease.     ROS:   Review of Systems   Constitutional: Negative.    Respiratory: Negative.     Cardiovascular: Negative.    Gastrointestinal:  Positive for abdominal pain.   Musculoskeletal: Negative.    All other systems reviewed and are negative.       Physical Exam:   Physical Exam  Vitals reviewed.   Constitutional:       Appearance: Normal appearance. She is obese.   HENT:      Head: Normocephalic and atraumatic.      Mouth/Throat:      Mouth: Mucous membranes are moist.   Eyes:      Extraocular Movements: Extraocular movements intact.   Cardiovascular:      Rate and Rhythm: Normal rate and regular rhythm.      Pulses: Normal pulses.      Heart sounds: Normal heart sounds.   Pulmonary:      Effort: Pulmonary effort is normal.      Breath sounds: Normal breath sounds.   Abdominal:      General: Abdomen is flat. Bowel sounds are normal. There is no distension.      Palpations: Abdomen is soft. There is no mass.      Tenderness: There is no abdominal tenderness. There is no guarding or rebound.      Hernia: No hernia is present.   Neurological:      Mental Status: She is alert.          Lab Results:   Lab Results   Component Value Date    WBC 9.36 06/25/2024    HGB 13.2 06/25/2024    HCT 41.0 06/25/2024    MCV 82.8 06/25/2024    RDW 13.5 06/25/2024     06/25/2024    NEUTRORELPCT 65.2 06/25/2024 "    LYMPHORELPCT 25.4 06/25/2024    MONORELPCT 5.6 06/25/2024    EOSRELPCT 2.7 06/25/2024    BASORELPCT 0.7 06/25/2024    NEUTROABS 6.10 06/25/2024    LYMPHSABS 2.38 06/25/2024       Lab Results   Component Value Date     06/25/2024    K 4.2 06/25/2024    CO2 26.0 06/25/2024     06/25/2024    BUN 13 06/25/2024    CREATININE 0.98 06/25/2024    EGFRIFNONA 79 01/14/2022    EGFRIFAFRI 90 10/09/2017    GLUCOSE 88 06/25/2024    CALCIUM 9.4 06/25/2024    ALKPHOS 89 06/25/2024    AST 17 06/25/2024    ALT 9 06/25/2024    BILITOT 0.6 06/25/2024    ALBUMIN 4.1 06/25/2024    PROTEINTOT 7.3 06/25/2024    MG 1.9 04/08/2019       Pathology:        Endoscopy:        Imaging:  US Breast Right Limited 06/12/2024       Results review: During today's encounter, all relevant clinical data has been reviewed.      Assessment and Plan    1. Esophageal dysphagia (Primary)  Esophagram was notable for thickened acid reflux and occasional esophageal tertiary contractions.  Largely suspect that patient's esophageal's contractions or secondary to uncontrolled acid reflux.  Will initiate patient on Protonix 40 mg daily.  Will obtain EGD for further evaluations and to obtain biopsies.  -     pantoprazole (Protonix) 40 MG EC tablet; Take 1 tablet by mouth 2 (Two) Times a Day Before Meals.  Dispense: 60 tablet; Refill: 5  -     Case Request; Standing  -     Follow Anesthesia Guidelines / Protocol; Standing  -     Obtain Informed Consent; Standing  -     Case Request    2. Gastroesophageal reflux disease, unspecified whether esophagitis present  Given that esophagram was notable for acid reflux to mid esophagus and worsening dysphagia, will initiate Protonix 40 mg twice daily.  -     pantoprazole (Protonix) 40 MG EC tablet; Take 1 tablet by mouth 2 (Two) Times a Day Before Meals.  Dispense: 60 tablet; Refill: 5  Discontinue omeprazole.  Discussed management for GERD: encouraged weight loss, HOB elevation at night, avoidance of meals 2-3  hours before bedtime, avoid trigger foods (caffeine, alcohol, chocolate, acidic/spicy foods e.g oranges/tomatoes), and encouraged smoking cessation    New Medications:   New Medications Ordered This Visit   Medications    pantoprazole (Protonix) 40 MG EC tablet     Sig: Take 1 tablet by mouth 2 (Two) Times a Day Before Meals.     Dispense:  60 tablet     Refill:  5       Discontinued Medications:   Medications Discontinued During This Encounter   Medication Reason    omeprazole (priLOSEC) 40 MG capsule Not Efficacious        Visit Diagnoses:    ICD-10-CM ICD-9-CM   1. Esophageal dysphagia  R13.19 787.29   2. Gastroesophageal reflux disease, unspecified whether esophagitis present  K21.9 530.81            Follow Up:   Return in about 8 weeks (around 8/22/2024).    The patient was in agreement with the plan and all questions were answered to patient's satisfaction.        This document has been electronically signed by Morena Roberson PA-C   June 27, 2024 14:37 EDT    Dictated Utilizing Dragon Dictation: Part of this note may be an electronic transcription/translation of spoken language to printed text using the Dragon Dictation System.    CC:  BLAYNE Foster*  Howard Arboleda MD

## 2024-07-31 RX ORDER — HYDROXYZINE PAMOATE 25 MG/1
25 CAPSULE ORAL 3 TIMES DAILY PRN
Qty: 90 CAPSULE | Refills: 0 | Status: SHIPPED | OUTPATIENT
Start: 2024-07-31

## 2024-08-01 DIAGNOSIS — F41.8 ANXIETY WITH DEPRESSION: Primary | Chronic | ICD-10-CM

## 2024-08-09 ENCOUNTER — OFFICE VISIT (OUTPATIENT)
Dept: FAMILY MEDICINE CLINIC | Facility: CLINIC | Age: 49
End: 2024-08-09
Payer: COMMERCIAL

## 2024-08-09 DIAGNOSIS — M79.7 FIBROMYALGIA: ICD-10-CM

## 2024-08-09 DIAGNOSIS — G43.009 MIGRAINE WITHOUT AURA AND WITHOUT STATUS MIGRAINOSUS, NOT INTRACTABLE: ICD-10-CM

## 2024-08-09 DIAGNOSIS — M51.36 DDD (DEGENERATIVE DISC DISEASE), LUMBAR: ICD-10-CM

## 2024-08-09 DIAGNOSIS — Z00.00 HEALTHCARE MAINTENANCE: ICD-10-CM

## 2024-08-09 DIAGNOSIS — K76.0 NAFLD (NONALCOHOLIC FATTY LIVER DISEASE): ICD-10-CM

## 2024-08-09 DIAGNOSIS — Z90.3 HISTORY OF SLEEVE GASTRECTOMY: ICD-10-CM

## 2024-08-09 DIAGNOSIS — K59.09 CHRONIC CONSTIPATION: ICD-10-CM

## 2024-08-09 DIAGNOSIS — Z17.0 MALIGNANT NEOPLASM OF LEFT BREAST IN FEMALE, ESTROGEN RECEPTOR POSITIVE, UNSPECIFIED SITE OF BREAST: ICD-10-CM

## 2024-08-09 DIAGNOSIS — E66.01 CLASS 3 SEVERE OBESITY WITH SERIOUS COMORBIDITY AND BODY MASS INDEX (BMI) OF 40.0 TO 44.9 IN ADULT, UNSPECIFIED OBESITY TYPE: ICD-10-CM

## 2024-08-09 DIAGNOSIS — F41.8 ANXIETY WITH DEPRESSION: Chronic | ICD-10-CM

## 2024-08-09 DIAGNOSIS — I10 ESSENTIAL HYPERTENSION: Primary | Chronic | ICD-10-CM

## 2024-08-09 DIAGNOSIS — C50.912 MALIGNANT NEOPLASM OF LEFT BREAST IN FEMALE, ESTROGEN RECEPTOR POSITIVE, UNSPECIFIED SITE OF BREAST: ICD-10-CM

## 2024-08-09 DIAGNOSIS — N95.1 MENOPAUSAL SYMPTOM: ICD-10-CM

## 2024-08-09 DIAGNOSIS — E06.3 HYPOTHYROIDISM DUE TO HASHIMOTO'S THYROIDITIS: ICD-10-CM

## 2024-08-09 DIAGNOSIS — E03.8 HYPOTHYROIDISM DUE TO HASHIMOTO'S THYROIDITIS: ICD-10-CM

## 2024-08-09 DIAGNOSIS — M15.9 GENERALIZED OSTEOARTHROSIS, INVOLVING MULTIPLE SITES: ICD-10-CM

## 2024-08-09 DIAGNOSIS — E55.9 VITAMIN D DEFICIENCY: Chronic | ICD-10-CM

## 2024-08-09 DIAGNOSIS — K21.9 GASTROESOPHAGEAL REFLUX DISEASE WITHOUT ESOPHAGITIS: ICD-10-CM

## 2024-08-09 DIAGNOSIS — R79.89 LOW VITAMIN B12 LEVEL: ICD-10-CM

## 2024-08-09 PROBLEM — E66.813 CLASS 3 SEVERE OBESITY WITH SERIOUS COMORBIDITY AND BODY MASS INDEX (BMI) OF 40.0 TO 44.9 IN ADULT: Status: ACTIVE | Noted: 2021-03-22

## 2024-08-09 RX ORDER — ATOGEPANT 30 MG/1
30 TABLET ORAL DAILY
Qty: 8 TABLET | Refills: 0 | COMMUNITY
Start: 2024-08-09

## 2024-08-09 RX ORDER — ATOGEPANT 60 MG/1
60 TABLET ORAL DAILY
Qty: 24 TABLET | Refills: 0 | COMMUNITY
Start: 2024-08-09

## 2024-08-09 RX ORDER — FEZOLINETANT 45 MG/1
45 TABLET, FILM COATED ORAL EVERY 24 HOURS
Qty: 90 TABLET | Refills: 3 | Status: SHIPPED | OUTPATIENT
Start: 2024-08-09

## 2024-08-09 NOTE — PROGRESS NOTES
Subjective   Briana Ugarte is a 49 y.o. female.     Chief Complaint  Headaches    History of Present Illness     Migraine Headaches  She has a long history of migraine.  She has been unable to obtain ajovy due to cost considerations.  She remains on botox injections alone for prevention, and has noted a continued increase in the frequency and severity of her headaches.  There has been no change in the quality nor any new associated symptoms.  She describes the pain as a unilateral or bilateral pounding generally preceded and associated with nausea.  Most headaches are also associated with photophobia and presbyphonia.  She continues to deny any changes in her vision, strength, or sensation.  She is using ubrelvy as needed and states she is generally functional within several hours.  She is scheduled to undergo another set of botox injections next week    Chronic Low Back Pain  She underwent a right L5-S1 microlumbar discectomy by Dr. Kauffman on 7/6/2022.  This was uncomplicated, and while she continues to have low back pain, there is no history of any further leg pain.      Fibromyalgia  She gives a long history of generalized muscle pain, difficulty sleeping, and fatigue.  There is no history of any joint stiffness, swelling, or redness and she continues to deny any rash.    Chronic Anxiety with Depression  She gives a long history of intermittent nervousness, worrying, and difficulty sleeping.  When severe her symptoms have been associated with depression.  She has done better with a change in escitalopram to sertraline.  She continues to deny any apparent side effects, and there is no history of any loss of interest in activities or suicidal ideation    Hypothyroidism  She remains on levothyroxine 75 daily.  She admits to fatigue.    Lab Results   Component Value Date    TSH 3.140 06/25/2024     Gastroesophageal Reflux Disease  She reports a continued improvement in her heartburn and dysphagia to solids  and liquids.  She continues to deny any divya vomiting or hematemesis, and there is no history of any abdominal pain, change in bowel habits, hematochezia, or melena.  She remains on omeprazole 40 twice daily, and continues to deny any NSAID use. She underwent an EGD by Dr. Clement on 6/2/2022 and was noted to have a small hiatal hernia, along with evidence of previous sleeve gastrectomy.  She underwent a reassessment with him on 3/20/2024 and an esophagram revealed a small sliding hiatal hernia, gastroesophageal reflux to the level of the mid esophagus, and occasional tertiary contractions.      History of Breast Cancer  She is approximately 5 years post left total mastectomy with a level 1 and 2 axillary lymph node dissection following a positive sentinel node.  3 of 14 lymph nodes were positive.  She was eventually staged as 1A mpT2 pN1a Mo ER+ MI+ HER2 +.  She was treated with adjuvant chemotherapy and radiation therapy and remains on tamoxifen.  She continues to be followed by  oncology.  She underwent an oncology reassessment with Dr. Benson on 8/6/2024 and plans are being made to transition her to an aromatase inhibitor    Previous TLH/BSO  She underwent a TLH/BSO by Dr. Cardona at  on 7/17/2023.  This was uncomplicated and pathology was reported as showing adenomyosis and leiomyomas up to 1 cm in diameter.  With the exception of intermittent hot flashes, she has had no sequela.  She feels that veozah has helped significantly    Labs  Most recent vitamin D 29.2 with a B12 of 541  Lab Results   Component Value Date    WBC 9.36 06/25/2024    HGB 13.2 06/25/2024    HCT 41.0 06/25/2024    MCV 82.8 06/25/2024     06/25/2024     Lab Results   Component Value Date    GLUCOSE 88 06/25/2024    BUN 13 06/25/2024    CREATININE 0.98 06/25/2024     06/25/2024    K 4.2 06/25/2024     06/25/2024    CALCIUM 9.4 06/25/2024    PROTEINTOT 7.3 06/25/2024    ALBUMIN 4.1 06/25/2024    ALT 9 06/25/2024    AST 17  06/25/2024    ALKPHOS 89 06/25/2024    BILITOT 0.6 06/25/2024    GLOB 3.2 06/25/2024    AGRATIO 1.3 06/25/2024    BCR 13.3 06/25/2024    ANIONGAP 13.0 06/25/2024    EGFR 71.8 06/25/2024     Lab Results   Component Value Date    CHOL 191 06/25/2024    CHLPL 170 10/09/2017    TRIG 70 06/25/2024    HDL 85 (H) 06/25/2024    LDL 93 06/25/2024     The following portions of the patient's history were reviewed and updated as appropriate: allergies, current medications, past medical history, past social history, and problem list.    Review of Systems   Constitutional:  Positive for fatigue. Negative for chills and fever.   HENT:  Negative for congestion, ear pain, rhinorrhea and sore throat.    Eyes:  Negative for visual disturbance.   Respiratory:  Negative for cough, shortness of breath and wheezing.    Cardiovascular:  Negative for chest pain, palpitations and leg swelling.   Gastrointestinal:  Positive for GERD. Negative for abdominal pain, blood in stool, constipation, diarrhea, nausea and vomiting.        Occasional dysphagia   Endocrine:        Hot flashes   Genitourinary:  Negative for dysuria and hematuria.   Musculoskeletal:  Positive for arthralgias, back pain, gait problem and myalgias. Negative for joint swelling.   Skin:  Negative for rash.   Neurological:  Positive for weakness (generalized), numbness and headache. Negative for dizziness.   Psychiatric/Behavioral:  Positive for sleep disturbance. Negative for suicidal ideas and depressed mood. The patient is nervous/anxious.      Objective   Physical Exam  Constitutional:       General: She is not in acute distress.     Appearance: Normal appearance. She is well-developed. She is not diaphoretic.      Comments: Bright and in good spirits. No apparent distress. No pallor, jaundice, diaphoresis, or cyanosis   HENT:      Head: Atraumatic.      Right Ear: Tympanic membrane, ear canal and external ear normal.      Left Ear: Tympanic membrane, ear canal and external  ear normal.      Mouth/Throat:      Lips: No lesions.      Mouth: Mucous membranes are moist. No oral lesions.      Pharynx: No oropharyngeal exudate or posterior oropharyngeal erythema.   Eyes:      General: Lids are normal.      Extraocular Movements: Extraocular movements intact.      Conjunctiva/sclera: Conjunctivae normal.      Pupils: Pupils are equal.   Neck:      Thyroid: No thyroid mass or thyromegaly.      Vascular: No carotid bruit or JVD.      Trachea: Trachea normal. No tracheal deviation.   Cardiovascular:      Rate and Rhythm: Normal rate and regular rhythm.      Heart sounds: Normal heart sounds, S1 normal and S2 normal. No murmur heard.     No gallop.   Pulmonary:      Effort: Pulmonary effort is normal.      Breath sounds: Normal breath sounds. No decreased breath sounds, wheezing, rhonchi or rales.   Abdominal:      General: Bowel sounds are normal. There is no distension.   Musculoskeletal:      Right lower leg: No edema.      Left lower leg: No edema.   Lymphadenopathy:      Head:      Right side of head: No submental, submandibular, tonsillar, preauricular, posterior auricular or occipital adenopathy.      Left side of head: No submental, submandibular, tonsillar, preauricular, posterior auricular or occipital adenopathy.      Cervical: No cervical adenopathy.      Upper Body:      Right upper body: No supraclavicular adenopathy.      Left upper body: No supraclavicular adenopathy.   Skin:     General: Skin is warm.      Coloration: Skin is not cyanotic, jaundiced or pale.      Findings: No rash.      Nails: There is no clubbing.   Neurological:      Mental Status: She is alert and oriented to person, place, and time.      Cranial Nerves: No cranial nerve deficit, dysarthria or facial asymmetry.      Sensory: No sensory deficit.      Motor: No tremor.      Coordination: Coordination normal.      Gait: Gait normal.   Psychiatric:         Attention and Perception: Attention normal.         Mood  and Affect: Mood normal.         Speech: Speech normal.         Behavior: Behavior normal.         Thought Content: Thought content normal.       Assessment & Plan   Problems Addressed this Visit          Cardiac and Vasculature    Essential hypertension   BP remains at goal off medication  Will continue to monitor       Endocrine and Metabolic    Vitamin D deficiency (Chronic)    Class 3 severe obesity with serious comorbidity and body mass index (BMI) of 40.0 to 44.9 in adult    History of sleeve gastrectomy    Hypothyroidism due to Hashimoto's thyroiditis  Clinically and bio-chemically euthyroid.  Continue current medication.    Low vitamin B12 level  Continue supplementation with monitoring.       Gastrointestinal Abdominal     Chronic constipation    Gastroesophageal reflux disease without esophagitis    NAFLD (nonalcoholic fatty liver disease)  Will continue to monitor       Genitourinary and Reproductive     Menopausal symptom  Continue current medication for now  If she notes a significant improvement in her hot flashes with the change to an aromatase inhibitor will discontinue    Relevant Medications    Fezolinetant (Veozah) 45 MG tablet       Health Encounters    Healthcare maintenance  Recommended COVID and influenza vaccinations this fall.       Hematology and Neoplasia    Malignant neoplasm of left breast in female, estrogen receptor positive  Follow up with oncology       Mental Health    Anxiety with depression (Chronic)  Significant situational component.   Supportive therapy.   Continue current medication.  Encouraged to report if any worse or if any new symptoms or concerns.       Musculoskeletal and Injuries    Fibromyalgia  Reminded regarding symptomatic treatment.   Continue current medication  Encouraged to report if any worse or if any new symptoms or concerns.    Generalized osteoarthrosis, involving multiple sites  As above.       Neuro    DDD (degenerative disc disease), lumbar    Migraine  without aura and without status migrainosus, not intractable  Reviewed preventative options going forward.  Her daughter has done well with atogepant and she would like to try the same.  Provided with samples  Follow-up with botox injections  Encouraged to report if any worse, any new symptoms, or if no better over the next 6 to 8 weeks    Relevant Medications    Atogepant (Qulipta) 30 MG tablet    Atogepant (Qulipta) 60 MG tablet     Diagnoses         Codes Comments    Essential hypertension    -  Primary ICD-10-CM: I10  ICD-9-CM: 401.9     Vitamin D deficiency     ICD-10-CM: E55.9  ICD-9-CM: 268.9     Low vitamin B12 level     ICD-10-CM: R79.89  ICD-9-CM: 790.6     Hypothyroidism due to Hashimoto's thyroiditis     ICD-10-CM: E03.8, E06.3  ICD-9-CM: 244.8, 245.2     History of sleeve gastrectomy     ICD-10-CM: Z90.3  ICD-9-CM: V15.29     Class 3 severe obesity with serious comorbidity and body mass index (BMI) of 40.0 to 44.9 in adult, unspecified obesity type     ICD-10-CM: E66.01, Z68.41  ICD-9-CM: 278.01, V85.41     NAFLD (nonalcoholic fatty liver disease)     ICD-10-CM: K76.0  ICD-9-CM: 571.8     Gastroesophageal reflux disease without esophagitis     ICD-10-CM: K21.9  ICD-9-CM: 530.81     Chronic constipation     ICD-10-CM: K59.09  ICD-9-CM: 564.00     Healthcare maintenance     ICD-10-CM: Z00.00  ICD-9-CM: V70.0     Malignant neoplasm of left breast in female, estrogen receptor positive, unspecified site of breast     ICD-10-CM: C50.912, Z17.0  ICD-9-CM: 174.9, V86.0     Anxiety with depression     ICD-10-CM: F41.8  ICD-9-CM: 300.4     Generalized osteoarthrosis, involving multiple sites     ICD-10-CM: M15.9  ICD-9-CM: 715.09     Fibromyalgia     ICD-10-CM: M79.7  ICD-9-CM: 729.1     Migraine without aura and without status migrainosus, not intractable     ICD-10-CM: G43.009  ICD-9-CM: 346.10     DDD (degenerative disc disease), lumbar     ICD-10-CM: M51.36  ICD-9-CM: 722.52     Menopausal symptom      ICD-10-CM: N95.1  ICD-9-CM: 627.2

## 2024-08-10 VITALS
TEMPERATURE: 98.8 F | HEART RATE: 92 BPM | OXYGEN SATURATION: 98 % | SYSTOLIC BLOOD PRESSURE: 126 MMHG | HEIGHT: 65 IN | BODY MASS INDEX: 40.32 KG/M2 | WEIGHT: 242 LBS | DIASTOLIC BLOOD PRESSURE: 63 MMHG | RESPIRATION RATE: 14 BRPM

## 2024-08-10 PROBLEM — N95.1 MENOPAUSAL SYMPTOM: Status: ACTIVE | Noted: 2024-08-10

## 2024-08-21 ENCOUNTER — ANESTHESIA (OUTPATIENT)
Dept: PERIOP | Facility: HOSPITAL | Age: 49
End: 2024-08-21
Payer: COMMERCIAL

## 2024-08-21 ENCOUNTER — ANESTHESIA EVENT (OUTPATIENT)
Dept: PERIOP | Facility: HOSPITAL | Age: 49
End: 2024-08-21
Payer: COMMERCIAL

## 2024-08-21 ENCOUNTER — HOSPITAL ENCOUNTER (OUTPATIENT)
Facility: HOSPITAL | Age: 49
Setting detail: HOSPITAL OUTPATIENT SURGERY
Discharge: HOME | End: 2024-08-21
Attending: INTERNAL MEDICINE | Admitting: ANESTHESIOLOGY
Payer: COMMERCIAL

## 2024-08-21 VITALS
WEIGHT: 234 LBS | BODY MASS INDEX: 37.61 KG/M2 | TEMPERATURE: 97.8 F | SYSTOLIC BLOOD PRESSURE: 115 MMHG | HEART RATE: 64 BPM | HEIGHT: 66 IN | RESPIRATION RATE: 18 BRPM | OXYGEN SATURATION: 99 % | DIASTOLIC BLOOD PRESSURE: 72 MMHG

## 2024-08-21 DIAGNOSIS — R13.19 ESOPHAGEAL DYSPHAGIA: ICD-10-CM

## 2024-08-21 PROCEDURE — 25010000002 PROPOFOL 200 MG/20ML EMULSION: Performed by: NURSE ANESTHETIST, CERTIFIED REGISTERED

## 2024-08-21 PROCEDURE — 25810000003 LACTATED RINGERS PER 1000 ML: Performed by: ANESTHESIOLOGY

## 2024-08-21 PROCEDURE — 43239 EGD BIOPSY SINGLE/MULTIPLE: CPT | Performed by: INTERNAL MEDICINE

## 2024-08-21 PROCEDURE — 25010000002 MIDAZOLAM PER 1 MG: Performed by: NURSE ANESTHETIST, CERTIFIED REGISTERED

## 2024-08-21 RX ORDER — SODIUM CHLORIDE, SODIUM LACTATE, POTASSIUM CHLORIDE, CALCIUM CHLORIDE 600; 310; 30; 20 MG/100ML; MG/100ML; MG/100ML; MG/100ML
100 INJECTION, SOLUTION INTRAVENOUS ONCE AS NEEDED
Status: DISCONTINUED | OUTPATIENT
Start: 2024-08-21 | End: 2024-08-21 | Stop reason: HOSPADM

## 2024-08-21 RX ORDER — SODIUM CHLORIDE 0.9 % (FLUSH) 0.9 %
10 SYRINGE (ML) INJECTION EVERY 12 HOURS SCHEDULED
Status: DISCONTINUED | OUTPATIENT
Start: 2024-08-21 | End: 2024-08-21 | Stop reason: HOSPADM

## 2024-08-21 RX ORDER — KETOROLAC TROMETHAMINE 30 MG/ML
30 INJECTION, SOLUTION INTRAMUSCULAR; INTRAVENOUS EVERY 6 HOURS PRN
Status: DISCONTINUED | OUTPATIENT
Start: 2024-08-21 | End: 2024-08-21 | Stop reason: HOSPADM

## 2024-08-21 RX ORDER — MIDAZOLAM HYDROCHLORIDE 1 MG/ML
1 INJECTION INTRAMUSCULAR; INTRAVENOUS
Status: DISCONTINUED | OUTPATIENT
Start: 2024-08-21 | End: 2024-08-21 | Stop reason: HOSPADM

## 2024-08-21 RX ORDER — SODIUM CHLORIDE 9 MG/ML
40 INJECTION, SOLUTION INTRAVENOUS AS NEEDED
Status: DISCONTINUED | OUTPATIENT
Start: 2024-08-21 | End: 2024-08-21 | Stop reason: HOSPADM

## 2024-08-21 RX ORDER — MIDAZOLAM HYDROCHLORIDE 1 MG/ML
INJECTION INTRAMUSCULAR; INTRAVENOUS AS NEEDED
Status: DISCONTINUED | OUTPATIENT
Start: 2024-08-21 | End: 2024-08-21 | Stop reason: SURG

## 2024-08-21 RX ORDER — ONDANSETRON 2 MG/ML
4 INJECTION INTRAMUSCULAR; INTRAVENOUS AS NEEDED
Status: DISCONTINUED | OUTPATIENT
Start: 2024-08-21 | End: 2024-08-21 | Stop reason: HOSPADM

## 2024-08-21 RX ORDER — IPRATROPIUM BROMIDE AND ALBUTEROL SULFATE 2.5; .5 MG/3ML; MG/3ML
3 SOLUTION RESPIRATORY (INHALATION) ONCE AS NEEDED
Status: DISCONTINUED | OUTPATIENT
Start: 2024-08-21 | End: 2024-08-21 | Stop reason: HOSPADM

## 2024-08-21 RX ORDER — OXYCODONE AND ACETAMINOPHEN 5; 325 MG/1; MG/1
1 TABLET ORAL ONCE AS NEEDED
Status: DISCONTINUED | OUTPATIENT
Start: 2024-08-21 | End: 2024-08-21 | Stop reason: HOSPADM

## 2024-08-21 RX ORDER — SODIUM CHLORIDE 0.9 % (FLUSH) 0.9 %
10 SYRINGE (ML) INJECTION AS NEEDED
Status: DISCONTINUED | OUTPATIENT
Start: 2024-08-21 | End: 2024-08-21 | Stop reason: HOSPADM

## 2024-08-21 RX ORDER — MEPERIDINE HYDROCHLORIDE 25 MG/ML
12.5 INJECTION INTRAMUSCULAR; INTRAVENOUS; SUBCUTANEOUS
Status: DISCONTINUED | OUTPATIENT
Start: 2024-08-21 | End: 2024-08-21 | Stop reason: HOSPADM

## 2024-08-21 RX ORDER — PROPOFOL 10 MG/ML
INJECTION, EMULSION INTRAVENOUS CONTINUOUS PRN
Status: DISCONTINUED | OUTPATIENT
Start: 2024-08-21 | End: 2024-08-21 | Stop reason: SURG

## 2024-08-21 RX ORDER — FENTANYL CITRATE 50 UG/ML
50 INJECTION, SOLUTION INTRAMUSCULAR; INTRAVENOUS
Status: DISCONTINUED | OUTPATIENT
Start: 2024-08-21 | End: 2024-08-21 | Stop reason: HOSPADM

## 2024-08-21 RX ORDER — SODIUM CHLORIDE, SODIUM LACTATE, POTASSIUM CHLORIDE, CALCIUM CHLORIDE 600; 310; 30; 20 MG/100ML; MG/100ML; MG/100ML; MG/100ML
125 INJECTION, SOLUTION INTRAVENOUS ONCE
Status: COMPLETED | OUTPATIENT
Start: 2024-08-21 | End: 2024-08-21

## 2024-08-21 RX ADMIN — MIDAZOLAM HYDROCHLORIDE 2 MG: 1 INJECTION, SOLUTION INTRAMUSCULAR; INTRAVENOUS at 11:22

## 2024-08-21 RX ADMIN — MIDAZOLAM HYDROCHLORIDE 2 MG: 1 INJECTION, SOLUTION INTRAMUSCULAR; INTRAVENOUS at 11:15

## 2024-08-21 RX ADMIN — PROPOFOL 100 MCG/KG/MIN: 10 INJECTION, EMULSION INTRAVENOUS at 11:19

## 2024-08-21 RX ADMIN — SODIUM CHLORIDE, POTASSIUM CHLORIDE, SODIUM LACTATE AND CALCIUM CHLORIDE: 600; 310; 30; 20 INJECTION, SOLUTION INTRAVENOUS at 11:15

## 2024-08-21 NOTE — H&P
[facility]    Patient Identification:  Name:  Briana Ugarte  Age:  49 y.o.  Sex:  female  :  1975  MRN:  [id number]   Visit Number:  [id number]  Primary Care Physician:  Howard Arboleda MD       Chief complaint: EGD for further evaluation of dysphagia.    History of presenting illness:  49 y.o. female presents today for EGD due to difficulty swallowing.  Patient had an EGD/colonoscopy performed in 2022 by Dr. Clement.  EGD was notable for small hiatal hernia, sleeve gastrectomy found, otherwise unremarkable.  She had an esophagram performed in 2024 that was notable for occasional esophageal tertiary contractions without dysmotility, small sliding hiatal hernia and GERD reflux to the level of mid thoracic esophagus.  She was placed on omeprazole 40 mg twice daily.  Reports that this had not been helping and she could feel esophageal spasms with eating or drinking.  They, patient states the Protonix has helped with her acid reflux.  She is still having some acid flares a few times each week but definitely less frequently.  She does note that she is still experiencing esophageal spasms.  It does not happen with every meal.  The spasm seems to happen more often with meat and cold foods/liquids.  She denies unintentional weight loss, nausea, vomiting, hematemesis, abdominal pain, changes in bowel habits.  She is s/p cholecystectomy.    Review of Systems   Constitutional: Negative.    HENT:  Positive for trouble swallowing.    Respiratory: Negative.     Cardiovascular: Negative.    Gastrointestinal: Negative.    Musculoskeletal: Negative.    All other systems reviewed and are negative.       Past Medical History:   Diagnosis Date    Anxiety     Arthritis     Breast cancer 2018    Breast mass     Cancer 2018    LEFT BREAST    Cervical disc disorder     Cholelithiasis     Removed    Chronic back pain     Fibromyalgia      Fibromyalgia, primary     GERD (gastroesophageal reflux disease)     Hypothyroidism     Joint pain     Kidney stone     Leukocytosis     Low back pain 2005    [facility]    Lumbosacral disc disease     Migraine     Morbid obesity     Neuropathy     Peripheral edema     Thoracic disc disorder      Past Surgical History:   Procedure Laterality Date    BACK SURGERY  2022    BARIATRIC SURGERY      sleeve gastrectomy    BREAST AUGMENTATION  2022    right    BREAST AUGMENTATION  2022    right    BREAST AUGMENTATION  2022    BREAST AUGMENTATION  2022    BREAST AUGMENTATION  2022    BREAST BIOPSY  left    BREAST SURGERY      left mastectomy with reconstruction and right augmentation     SECTION      x 2    CHOLECYSTECTOMY      COLONOSCOPY N/A 2022    Procedure: COLONOSCOPY;  Surgeon: Bishop Clement MD;  Location:  [facility];  Service: Gastroenterology;  Laterality: N/A;    ENDOSCOPY N/A 2022    Procedure: ESOPHAGOGASTRODUODENOSCOPY WITH ANESTHESIA;  Surgeon: Bishop Clement MD;  Location:  [facility];  Service: Gastroenterology;  Laterality: N/A;    EPIDURAL BLOCK   Spinal Block     Spinal Block    HYSTERECTOMY      LUMBAR DISCECTOMY Right 2022    Procedure: LUMBAR DISCECTOMY L5-S1 RIGHT;  Surgeon: Tejinder Kauffman MD;  Location:  [facility];  Service: Neurosurgery;  Laterality: Right;    REDUCTION MAMMAPLASTY  2022    Right    TRIGGER POINT INJECTION  Neck and shoulders     WISDOM TOOTH EXTRACTION       Family History   Problem Relation Age of Onset    Hyperlipidemia Mother     Arthritis Mother     Hypertension Mother     NICK disease Mother     Hiatal hernia Mother     Hypertension Father     Neuropathy Father     Migraines Father     Arthritis Father     Heart disease Brother     Congenital heart disease Brother     Drug abuse Brother         Passed away     Early death Brother     Cancer Maternal Aunt     Esophageal  cancer Maternal Uncle     Cancer Paternal Aunt         lung, brain    Cancer Paternal Aunt     Congenital heart disease Maternal Grandmother     Cancer Paternal Grandmother     Stroke Paternal Grandfather     Cancer Paternal Grandfather     Colon cancer Paternal Grandfather     Anxiety disorder Daughter     Mental illness Daughter         Diag with Schizephrenia    Depression Daughter     Cancer Other     Cancer Other     Breast cancer Neg Hx      Social History     Socioeconomic History    Marital status:     Number of children: 2   Tobacco Use    Smoking status: Never     Passive exposure: Never    Smokeless tobacco: Never    Tobacco comments:     Years ago I tried it.   Vaping Use    Vaping status: Never Used   Substance and Sexual Activity    Alcohol use: No    Drug use: No    Sexual activity: Not Currently     Partners: Male     Birth control/protection: Post-menopausal, Tubal ligation, Hysterectomy, Surgical     Comment: Tubal       Allergies:  Patient has no known allergies.    Prior to Admission Medications       Prescriptions Last Dose Informant Patient Reported? Taking?    acetaminophen (TYLENOL) 500 MG tablet Past Month  No Yes    1-2 tablets every 6 hours as needed for pain    Atogepant (Qulipta) 30 MG tablet Past Week  No Yes    Take 1 tablet by mouth Daily.    Atogepant (Qulipta) 60 MG tablet Past Week  No Yes    Take 1 tablet by mouth Daily.    cyanocobalamin 1000 MCG/ML injection Past Month  No Yes    ADMINISTER 1 ML IN THE MUSCLE EVERY 28 DAYS AS DIRECTED BY PRESCRIBER    cyclobenzaprine (FLEXERIL) 10 MG tablet 8/20/2024  Yes Yes    Take 1 tablet by mouth 3 (Three) Times a Day As Needed for Muscle Spasms.    dicyclomine (BENTYL) 20 MG tablet Past Month  No Yes    Take 1 tablet by mouth Every 6 (Six) Hours As Needed for Abdominal Cramping (choking symptoms).    Fezolinetant (Veozah) 45 MG tablet Past Month  No Yes    Take 1 tablet by mouth Daily.    hydrOXYzine pamoate (VISTARIL) 25 MG capsule  "8/20/2024  No Yes    Take 1 capsule by mouth 3 (Three) Times a Day As Needed for Anxiety.    oxyCODONE-acetaminophen (PERCOCET) 7.5-325 MG per tablet 8/20/2024  No Yes    Take 1 tablet by mouth Every 6 (Six) Hours As Needed for Moderate Pain .    pantoprazole (Protonix) 40 MG EC tablet 8/20/2024  No Yes    Take 1 tablet by mouth 2 (Two) Times a Day Before Meals.    pregabalin (LYRICA) 200 MG capsule 8/20/2024  Yes Yes    3 (Three) Times a Day.    sertraline (ZOLOFT) 100 MG tablet 8/20/2024  No Yes    Take 1 tablet by mouth Daily.    tamoxifen (NOLVADEX) 20 MG chemo tablet Past Week  Yes Yes    Take 1 tablet by mouth Daily.    ubrogepant (Ubrelvy) 100 MG tablet Past Month  No Yes    Take 1 tablet by mouth 1 (One) Time As Needed (Headache) for up to 1 dose.    vitamin D (ERGOCALCIFEROL) 1.25 MG (19164 UT) capsule capsule Past Month  No Yes    Take 1 capsule by mouth 1 (One) Time Per Week.          Hospital Scheduled Meds:  lactated ringers, 125 mL/hr, Intravenous, Once  sodium chloride, 10 mL, Intravenous, Q12H           Vital Signs:  Temp:  [98 °F (36.7 °C)] 98 °F (36.7 °C)  Heart Rate:  [71] 71  Resp:  [18] 18  BP: (131)/(92) 131/92      08/21/24  1019   Weight: 106 kg (234 lb)     Body mass index is 37.77 kg/m².    Physical Exam:  Constitutional:  Alert and oriented. Well developed and well nourished, in no acute distress.  HENT:  Head: Normocephalic and atraumatic.  Mouth:  Moist mucous membranes.  OP clear, mmm  Eyes:  Conjunctivae and EOM are normal.  Pupils are equal, round, and reactive to light.  No scleral icterus.  Neck:  Neck supple.  No JVD present.    Cardiovascular:  RRR, no MRG.  Pulmonary/Chest:  CTAB, unlabored.   Abdominal:  Soft.  Bowel sounds are normal.  No distension and no tenderness.   Psychiatric:  Normal mood and affect.  Behavior is normal.  Judgment and thought content normal.                     Invalid input(s): \"PROT\"CrCl cannot be calculated (Patient's most recent lab result is older " "than the maximum 30 days allowed.).  No results found for: \"AMMONIA\"          Lab Results   Component Value Date    HGBA1C 5.60 07/05/2022     Lab Results   Component Value Date    TSH 3.140 06/25/2024     Lab Results   Component Value Date    PREGTESTUR Negative 01/14/2022     Pain Management Panel          Latest Ref Rng & Units 12/22/2016   Pain Management Panel   Amphetamine, Urine Qual Negative Negative    Barbiturates Screen, Urine Negative Positive    Benzodiazepine Screen, Urine Negative Negative    Cocaine Screen, Urine Negative Negative    Methadone Screen , Urine Negative Negative       Details                 No results found for: \"BLOODCX\"  No results found for: \"URINECX\"  No results found for: \"WOUNDCX\"  No results found for: \"STOOLCX\"        Imaging Results (Last 7 Days)       ** No results found for the last 168 hours. **              Assessment and Plan:    Proceed with EGD for further evaluation of dysphagia and uncontrolled GERD.    Morena Roberson PA-C  08/21/24  10:36 EDT    "

## 2024-08-21 NOTE — ANESTHESIA POSTPROCEDURE EVALUATION
Patient: Briana Ugarte    Procedure Summary       Date: 08/21/24 Room / Location: [facility] OR  /  [facility]    Anesthesia Start: 1116 Anesthesia Stop: 1134    Procedure: ESOPHAGOGASTRODUODENOSCOPY WITH BIOPSY (Esophagus) Diagnosis:       Esophageal dysphagia      (Esophageal dysphagia [R13.19])    Surgeons: Skye Saucedo MD Provider: Francois Walker MD    Anesthesia Type: general ASA Status: 3            Anesthesia Type: general    Vitals  Vitals Value Taken Time   /69 08/21/24 1150   Temp 97.8 °F (36.6 °C) 08/21/24 1135   Pulse 65 08/21/24 1154   Resp 18 08/21/24 1145   SpO2 93 % 08/21/24 1154   Vitals shown include unfiled device data.        Post Anesthesia Care and Evaluation    Patient location during evaluation: PHASE II  Patient participation: complete - patient participated  Level of consciousness: awake and alert  Pain score: 1  Pain management: adequate    Airway patency: patent  Anesthetic complications: No anesthetic complications  PONV Status: controlled  Cardiovascular status: acceptable  Respiratory status: acceptable  Hydration status: acceptable

## 2024-08-21 NOTE — ANESTHESIA PREPROCEDURE EVALUATION
Anesthesia Evaluation     Patient summary reviewed and Nursing notes reviewed   no history of anesthetic complications:   NPO Solid Status: > 8 hours  NPO Liquid Status: > 2 hours           Airway   Mallampati: II  TM distance: >3 FB  Neck ROM: full  No difficulty expected  Dental - normal exam     Pulmonary - negative pulmonary ROS    breath sounds clear to auscultation  (-) COPD, asthma, sleep apnea  Cardiovascular   Exercise tolerance: good (4-7 METS)    ECG reviewed  Rhythm: regular  Rate: normal    (+) hypertension, hyperlipidemia  (-) dysrhythmias, angina, cardiac stents    ROS comment:   10/2019 - nl vef, rvsf/sz, trace mr, mild tr, trace ai, trace pi.      Neuro/Psych  (+) headaches, numbness, psychiatric history  (-) seizures, TIA  GI/Hepatic/Renal/Endo    (+) obesity, morbid obesity, GERD, liver disease fatty liver disease, renal disease-, thyroid problem hypothyroidism    Musculoskeletal     (+) myalgias  Abdominal   (+) obese   Substance History      OB/GYN          Other   arthritis,   history of cancer (breast; s/p ChemoXRT, last treatment 2019)    ROS/Med Hx Other: hgb 12.3 k 4.1  01/2022- [id number]                    Anesthesia Plan    ASA 3     general   total IV anesthesia  (  )  intravenous induction     Anesthetic plan, risks, benefits, and alternatives have been provided, discussed and informed consent has been obtained with: patient.    Use of blood products discussed with patient  Consented to blood products.    Plan discussed with CRNA.        CODE STATUS:

## 2024-08-22 LAB — REF LAB TEST METHOD: NORMAL

## 2024-08-22 NOTE — PROGRESS NOTES
At the time of your recent upper endoscopy, biopsies were taken of the esophagus.  Biopsies were benign.  Please continue Protonix 40 mg once daily.  Biopsies of the stomach were negative for H. pylori gastritis.

## 2024-08-26 DIAGNOSIS — F41.8 ANXIETY WITH DEPRESSION: Primary | Chronic | ICD-10-CM

## 2024-08-26 RX ORDER — TRIFLUOPERAZINE HYDROCHLORIDE 1 MG/1
1 TABLET, FILM COATED ORAL 2 TIMES DAILY PRN
Qty: 30 TABLET | Refills: 0 | Status: SHIPPED | OUTPATIENT
Start: 2024-08-26

## 2024-09-06 DIAGNOSIS — F41.8 ANXIETY WITH DEPRESSION: Primary | Chronic | ICD-10-CM

## 2024-09-06 RX ORDER — CLOMIPRAMINE HYDROCHLORIDE 25 MG/1
25 CAPSULE ORAL NIGHTLY
Qty: 30 CAPSULE | Refills: 5 | Status: SHIPPED | OUTPATIENT
Start: 2024-09-06

## 2024-09-06 RX ORDER — SERTRALINE HYDROCHLORIDE 100 MG/1
150 TABLET, FILM COATED ORAL DAILY
Qty: 45 TABLET | Refills: 5 | Status: SHIPPED | OUTPATIENT
Start: 2024-09-06

## 2024-09-09 ENCOUNTER — TELEPHONE (OUTPATIENT)
Dept: FAMILY MEDICINE CLINIC | Facility: CLINIC | Age: 49
End: 2024-09-09
Payer: COMMERCIAL

## 2024-09-27 ENCOUNTER — OFFICE VISIT (OUTPATIENT)
Dept: FAMILY MEDICINE CLINIC | Facility: CLINIC | Age: 49
End: 2024-09-27
Payer: COMMERCIAL

## 2024-09-27 VITALS
DIASTOLIC BLOOD PRESSURE: 60 MMHG | WEIGHT: 245 LBS | HEART RATE: 116 BPM | BODY MASS INDEX: 39.37 KG/M2 | HEIGHT: 66 IN | TEMPERATURE: 96.2 F | RESPIRATION RATE: 14 BRPM | SYSTOLIC BLOOD PRESSURE: 90 MMHG | OXYGEN SATURATION: 97 %

## 2024-09-27 DIAGNOSIS — Z29.9 PROPHYLACTIC MEASURE: ICD-10-CM

## 2024-09-27 DIAGNOSIS — H66.002 ACUTE SUPPURATIVE OTITIS MEDIA OF LEFT EAR WITHOUT SPONTANEOUS RUPTURE OF TYMPANIC MEMBRANE, RECURRENCE NOT SPECIFIED: ICD-10-CM

## 2024-09-27 DIAGNOSIS — J30.9 ALLERGIC RHINITIS, UNSPECIFIED SEASONALITY, UNSPECIFIED TRIGGER: ICD-10-CM

## 2024-09-27 DIAGNOSIS — J01.11 ACUTE RECURRENT FRONTAL SINUSITIS: Primary | ICD-10-CM

## 2024-09-27 PROCEDURE — 99213 OFFICE O/P EST LOW 20 MIN: CPT | Performed by: NURSE PRACTITIONER

## 2024-09-27 RX ORDER — CEFDINIR 300 MG/1
300 CAPSULE ORAL 2 TIMES DAILY
Qty: 14 CAPSULE | Refills: 0 | Status: SHIPPED | OUTPATIENT
Start: 2024-09-27 | End: 2024-10-04

## 2024-09-27 RX ORDER — FLUCONAZOLE 150 MG/1
150 TABLET ORAL DAILY
Qty: 2 TABLET | Refills: 0 | Status: SHIPPED | OUTPATIENT
Start: 2024-09-27

## 2024-09-27 RX ORDER — AZELASTINE 1 MG/ML
SPRAY, METERED NASAL
Qty: 1 EACH | Refills: 5 | Status: SHIPPED | OUTPATIENT
Start: 2024-09-27

## 2024-10-04 ENCOUNTER — OFFICE VISIT (OUTPATIENT)
Dept: FAMILY MEDICINE CLINIC | Facility: CLINIC | Age: 49
End: 2024-10-04
Payer: COMMERCIAL

## 2024-10-04 VITALS
HEIGHT: 66 IN | HEART RATE: 87 BPM | TEMPERATURE: 97.2 F | RESPIRATION RATE: 14 BRPM | SYSTOLIC BLOOD PRESSURE: 122 MMHG | OXYGEN SATURATION: 96 % | BODY MASS INDEX: 39.53 KG/M2 | DIASTOLIC BLOOD PRESSURE: 76 MMHG | WEIGHT: 246 LBS

## 2024-10-04 DIAGNOSIS — R05.9 COUGH, UNSPECIFIED TYPE: ICD-10-CM

## 2024-10-04 DIAGNOSIS — J02.9 SORE THROAT: ICD-10-CM

## 2024-10-04 DIAGNOSIS — J20.6 ACUTE BRONCHITIS DUE TO RHINOVIRUS: Primary | ICD-10-CM

## 2024-10-04 DIAGNOSIS — J01.00 ACUTE MAXILLARY SINUSITIS, RECURRENCE NOT SPECIFIED: ICD-10-CM

## 2024-10-04 DIAGNOSIS — J30.9 ALLERGIC RHINITIS, UNSPECIFIED SEASONALITY, UNSPECIFIED TRIGGER: ICD-10-CM

## 2024-10-04 LAB
B PARAPERT DNA SPEC QL NAA+PROBE: NOT DETECTED
B PERT DNA SPEC QL NAA+PROBE: NOT DETECTED
C PNEUM DNA NPH QL NAA+NON-PROBE: NOT DETECTED
EXPIRATION DATE: NORMAL
FLUAV SUBTYP SPEC NAA+PROBE: NOT DETECTED
FLUBV RNA ISLT QL NAA+PROBE: NOT DETECTED
HADV DNA SPEC NAA+PROBE: NOT DETECTED
HCOV 229E RNA SPEC QL NAA+PROBE: NOT DETECTED
HCOV HKU1 RNA SPEC QL NAA+PROBE: NOT DETECTED
HCOV NL63 RNA SPEC QL NAA+PROBE: NOT DETECTED
HCOV OC43 RNA SPEC QL NAA+PROBE: NOT DETECTED
HMPV RNA NPH QL NAA+NON-PROBE: NOT DETECTED
HPIV1 RNA ISLT QL NAA+PROBE: NOT DETECTED
HPIV2 RNA SPEC QL NAA+PROBE: NOT DETECTED
HPIV3 RNA NPH QL NAA+PROBE: NOT DETECTED
HPIV4 P GENE NPH QL NAA+PROBE: NOT DETECTED
INTERNAL CONTROL: NORMAL
Lab: NORMAL
M PNEUMO IGG SER IA-ACNC: NOT DETECTED
RHINOVIRUS RNA SPEC NAA+PROBE: DETECTED
RSV RNA NPH QL NAA+NON-PROBE: NOT DETECTED
S PYO RRNA THROAT QL PROBE: NEGATIVE
SARS-COV-2 RNA NPH QL NAA+NON-PROBE: NOT DETECTED

## 2024-10-04 PROCEDURE — 0202U NFCT DS 22 TRGT SARS-COV-2: CPT | Performed by: NURSE PRACTITIONER

## 2024-10-04 RX ORDER — FLUTICASONE PROPIONATE 50 UG/1
1 SPRAY, METERED NASAL 2 TIMES DAILY
Qty: 11.1 ML | Refills: 0 | Status: SHIPPED | OUTPATIENT
Start: 2024-10-04

## 2024-10-04 RX ORDER — METHYLPREDNISOLONE ACETATE 80 MG/ML
80 INJECTION, SUSPENSION INTRA-ARTICULAR; INTRALESIONAL; INTRAMUSCULAR; SOFT TISSUE ONCE
Status: COMPLETED | OUTPATIENT
Start: 2024-10-04 | End: 2024-10-04

## 2024-10-04 RX ADMIN — METHYLPREDNISOLONE ACETATE 80 MG: 80 INJECTION, SUSPENSION INTRA-ARTICULAR; INTRALESIONAL; INTRAMUSCULAR; SOFT TISSUE at 12:37

## 2024-10-04 NOTE — PROGRESS NOTES
Please let her know that her panel did show Rhinovirus. I would wait and see how she responds to the injection and additional nasal spray for 24 hours prior to starting Augmentin

## 2024-10-04 NOTE — PROGRESS NOTES
History of Present Illness  Briana Ugarte is a 49 y.o. female who presents to [facility] today complaining of upper respiratory symptoms which started over a week ago and is not improving..     Upper Respiratory Symptoms   The current episode started over a week ago. The problem has been gradually worsening. Associated symptoms include congestion, ear pain, headaches and sinus pain.  She was seen on September 27 and prescribed Omnicef and nasal spray at that time with no significant improvement in her symptoms.     Earache   There is pain in both ears. The problem has worsen. She has tried heat packs and cefdinir for the symptoms. The treatment provided no relief.     The following portions of the patient's history were reviewed and updated as appropriate: allergies, current medications, past family history, past medical history, past social history, past surgical history and problem list.    Review of Systems   Constitutional:  Positive for activity change and fatigue. Negative for appetite change, chills, fever and unexpected weight change.   HENT:  Positive for congestion, ear pain, postnasal drip, rhinorrhea, sinus pressure and sinus pain. Negative for ear discharge, hearing loss, sneezing, sore throat and tinnitus.    Eyes:  Negative for pain, discharge, redness, itching and visual disturbance.   Respiratory:  Negative for cough, shortness of breath and wheezing.    Cardiovascular:  Negative for chest pain, palpitations and leg swelling.   Gastrointestinal:  Negative for nausea and vomiting.   Endocrine: Negative for cold intolerance, heat intolerance, polydipsia, polyphagia and polyuria.   Genitourinary:  Negative for dysuria.   Musculoskeletal:  Positive for arthralgias. Negative for joint swelling.   Skin:  Negative for color change and rash.   Neurological:  Positive for headaches. Negative for dizziness, tremors, speech difficulty, weakness and  "light-headedness.   Hematological:  Negative for adenopathy.   Psychiatric/Behavioral:  Negative for confusion and decreased concentration. The patient is not nervous/anxious.    All other systems reviewed and are negative.    Vital signs:  /76 (BP Location: Right arm, Patient Position: Sitting, Cuff Size: Adult)   Pulse 87   Temp 97.2 °F (36.2 °C) (Temporal)   Resp 14   Ht 167.6 cm (65.98\")   Wt 112 kg (246 lb)   SpO2 96%   BMI 39.72 kg/m²     Physical Exam  Vitals and nursing note reviewed.   Constitutional:       General: She is not in acute distress.     Appearance: She is well-developed.      Interventions: Face mask in place.   HENT:      Head: Normocephalic.      Right Ear: A middle ear effusion is present. Tympanic membrane is not erythematous or bulging.      Left Ear: A middle ear effusion is present. Tympanic membrane is not erythematous or bulging.      Nose: Congestion and rhinorrhea present.      Right Turbinates: Swollen.      Left Turbinates: Swollen.      Right Sinus: Frontal sinus tenderness present. No maxillary sinus tenderness.      Left Sinus: Frontal sinus tenderness present. No maxillary sinus tenderness.      Mouth/Throat:      Mouth: Mucous membranes are moist.      Pharynx: No oropharyngeal exudate or posterior oropharyngeal erythema.   Eyes:      General: No scleral icterus.        Right eye: No discharge.         Left eye: No discharge.      Conjunctiva/sclera: Conjunctivae normal.   Cardiovascular:      Rate and Rhythm: Normal rate and regular rhythm.      Heart sounds: Normal heart sounds. No murmur heard.     No friction rub.   Pulmonary:      Effort: Pulmonary effort is normal. No respiratory distress.      Breath sounds: Normal breath sounds. No decreased breath sounds, wheezing, rhonchi or rales.   Musculoskeletal:      Cervical back: Neck supple.   Skin:     General: Skin is warm and dry.      Capillary Refill: Capillary refill takes less than 2 seconds.      " Findings: No rash.   Neurological:      Mental Status: She is alert and oriented to person, place, and time.   Psychiatric:         Mood and Affect: Mood and affect normal.         Speech: Speech normal.         Behavior: Behavior is cooperative.         Thought Content: Thought content normal.     Result Review :  Results for orders placed or performed in visit on 10/04/24   Respiratory Panel PCR w/COVID-19(SARS-CoV-2) SURJIT/KINJAL/KESHAV/PAD/COR/LAURI In-House, NP Swab in UTM/VTM, 2 HR TAT - Swab, Nasopharynx    Specimen: Nasopharynx; Swab   Result Value Ref Range    ADENOVIRUS, PCR Not Detected Not Detected    Coronavirus 229E Not Detected Not Detected    Coronavirus HKU1 Not Detected Not Detected    Coronavirus NL63 Not Detected Not Detected    Coronavirus OC43 Not Detected Not Detected    COVID19 Not Detected Not Detected - Ref. Range    Human Metapneumovirus Not Detected Not Detected    Human Rhinovirus/Enterovirus Detected (A) Not Detected    Influenza A PCR Not Detected Not Detected    Influenza B PCR Not Detected Not Detected    Parainfluenza Virus 1 Not Detected Not Detected    Parainfluenza Virus 2 Not Detected Not Detected    Parainfluenza Virus 3 Not Detected Not Detected    Parainfluenza Virus 4 Not Detected Not Detected    RSV, PCR Not Detected Not Detected    Bordetella pertussis pcr Not Detected Not Detected    Bordetella parapertussis PCR Not Detected Not Detected    Chlamydophila pneumoniae PCR Not Detected Not Detected    Mycoplasma pneumo by PCR Not Detected Not Detected   POCT Strep A, molecular    Specimen: Swab   Result Value Ref Range    POC Strep A, Molecular Negative Negative    Internal Control Passed Passed    Lot Number [id number]     Expiration Date 07-10-25        Assessment & Plan     Diagnoses and all orders for this visit:    1. Acute bronchitis due to Rhinovirus (Primary)  Comments:  Counseled regarding supportive care measures.  Depo-Medrol 80 given today    2. Cough, unspecified  type  Comments:  Respiratory panel completed with diagnosis of rhinovirus.  Counseled regarding supportive care measures  Orders:  -     Respiratory Panel PCR w/COVID-19(SARS-CoV-2) SURJIT/KINJAL/KESHAV/PAD/COR/LAURI In-House, NP Swab in UTM/VTM, 2 HR TAT - Swab, Nasopharynx; Future  -     Respiratory Panel PCR w/COVID-19(SARS-CoV-2) SURJIT/KINJAL/KESHAV/PAD/COR/LAURI In-House, NP Swab in UTM/VTM, 2 HR TAT - Swab, Nasopharynx  -     methylPREDNISolone acetate (DEPO-medrol) injection 80 mg    3. Sore throat  Comments:  Rapid strep was negative which was discussed with Briana  Orders:  -     POCT Strep A, molecular    4. Acute maxillary sinusitis, recurrence not specified  Comments:  Augmentin 875 125 sent to pharmacy which she will put placed on hold awaiting respiratory panel and improvement from injection  Orders:  -     amoxicillin-clavulanate (AUGMENTIN) 875-125 MG per tablet; Take 1 tablet by mouth 2 (Two) Times a Day for 7 days.  Dispense: 14 tablet; Refill: 0    5. Allergic rhinitis, unspecified seasonality, unspecified trigger  Comments:  Continue Flonase nasal spray  Orders:  -     fluticasone (FLONASE) 50 MCG/ACT nasal spray; Administer 1 spray into the nostril(s) as directed by provider 2 (Two) Times a Day.  Dispense: 11.1 mL; Refill: 0      Follow Up If symptoms worsen or do not improve  Findings and recommendations discussed with Briana. Reviewed and options.  Respiratory panel results were discussed with her when available.  Counseled regarding supportive care measures.  Signs and symptoms of concern reviewed and if occur to seek further evaluation or if symptoms worsen or do not improve.  .Briana was given instructions and counseling regarding her condition or for health maintenance advice. Please see specific information pulled into the AVS if appropriate.      This document has been electronically signed by:

## 2024-10-04 NOTE — PROGRESS NOTES
Injection  Injection performed in left ventrogluteal by Yesy Verdugo MA. Patient tolerated the procedure well without complications.  10/04/24   Yesy Verdugo MA

## 2024-10-15 ENCOUNTER — OFFICE VISIT (OUTPATIENT)
Dept: FAMILY MEDICINE CLINIC | Facility: CLINIC | Age: 49
End: 2024-10-15
Payer: COMMERCIAL

## 2024-10-15 VITALS
RESPIRATION RATE: 14 BRPM | SYSTOLIC BLOOD PRESSURE: 118 MMHG | TEMPERATURE: 98.6 F | BODY MASS INDEX: 39.21 KG/M2 | DIASTOLIC BLOOD PRESSURE: 62 MMHG | HEART RATE: 100 BPM | OXYGEN SATURATION: 97 % | HEIGHT: 66 IN | WEIGHT: 244 LBS

## 2024-10-15 DIAGNOSIS — E66.813 CLASS 3 SEVERE OBESITY WITH SERIOUS COMORBIDITY AND BODY MASS INDEX (BMI) OF 40.0 TO 44.9 IN ADULT, UNSPECIFIED OBESITY TYPE: ICD-10-CM

## 2024-10-15 DIAGNOSIS — K76.0 NAFLD (NONALCOHOLIC FATTY LIVER DISEASE): ICD-10-CM

## 2024-10-15 DIAGNOSIS — K21.9 GASTROESOPHAGEAL REFLUX DISEASE WITHOUT ESOPHAGITIS: ICD-10-CM

## 2024-10-15 DIAGNOSIS — E55.9 VITAMIN D DEFICIENCY: Chronic | ICD-10-CM

## 2024-10-15 DIAGNOSIS — E66.01 CLASS 3 SEVERE OBESITY WITH SERIOUS COMORBIDITY AND BODY MASS INDEX (BMI) OF 40.0 TO 44.9 IN ADULT, UNSPECIFIED OBESITY TYPE: ICD-10-CM

## 2024-10-15 DIAGNOSIS — N95.1 MENOPAUSAL SYMPTOM: ICD-10-CM

## 2024-10-15 DIAGNOSIS — E06.3 HYPOTHYROIDISM DUE TO HASHIMOTO'S THYROIDITIS: ICD-10-CM

## 2024-10-15 DIAGNOSIS — M15.9 GENERALIZED OSTEOARTHROSIS, INVOLVING MULTIPLE SITES: ICD-10-CM

## 2024-10-15 DIAGNOSIS — M79.7 FIBROMYALGIA: ICD-10-CM

## 2024-10-15 DIAGNOSIS — G43.009 MIGRAINE WITHOUT AURA AND WITHOUT STATUS MIGRAINOSUS, NOT INTRACTABLE: ICD-10-CM

## 2024-10-15 DIAGNOSIS — R79.89 LOW VITAMIN B12 LEVEL: ICD-10-CM

## 2024-10-15 DIAGNOSIS — Z90.3 HISTORY OF SLEEVE GASTRECTOMY: ICD-10-CM

## 2024-10-15 DIAGNOSIS — F41.8 ANXIETY WITH DEPRESSION: Chronic | ICD-10-CM

## 2024-10-15 DIAGNOSIS — K59.09 CHRONIC CONSTIPATION: ICD-10-CM

## 2024-10-15 DIAGNOSIS — C50.912 MALIGNANT NEOPLASM OF LEFT BREAST IN FEMALE, ESTROGEN RECEPTOR POSITIVE, UNSPECIFIED SITE OF BREAST: ICD-10-CM

## 2024-10-15 DIAGNOSIS — Z17.0 MALIGNANT NEOPLASM OF LEFT BREAST IN FEMALE, ESTROGEN RECEPTOR POSITIVE, UNSPECIFIED SITE OF BREAST: ICD-10-CM

## 2024-10-15 DIAGNOSIS — I10 ESSENTIAL HYPERTENSION: Primary | Chronic | ICD-10-CM

## 2024-10-15 DIAGNOSIS — Z00.00 HEALTHCARE MAINTENANCE: ICD-10-CM

## 2024-10-15 PROBLEM — K76.89 NODULE ON LIVER: Status: RESOLVED | Noted: 2022-01-19 | Resolved: 2024-10-15

## 2024-10-15 PROBLEM — R13.10 DYSPHAGIA: Status: RESOLVED | Noted: 2024-02-13 | Resolved: 2024-10-15

## 2024-10-15 RX ORDER — ANASTROZOLE 1 MG/1
1 TABLET ORAL DAILY
COMMUNITY
Start: 2024-08-06 | End: 2025-08-06

## 2024-10-15 RX ORDER — ATOGEPANT 60 MG/1
60 TABLET ORAL DAILY
Qty: 4 TABLET | Refills: 0 | COMMUNITY
Start: 2024-10-15 | End: 2024-10-15 | Stop reason: SDUPTHER

## 2024-10-15 RX ORDER — ATOGEPANT 60 MG/1
60 TABLET ORAL DAILY
Qty: 30 TABLET | Refills: 5 | Status: SHIPPED | OUTPATIENT
Start: 2024-10-15

## 2024-10-15 NOTE — PROGRESS NOTES
Subjective   Briana Ugarte is a 49 y.o. female.     Chief Complaint  She returns for a scheduled reassessment of multiple medical problems including migraine headache with aura, chronic low back pain, fibromyalgia, chronic anxiety with depression, hypothyroidism, gastroesophageal reflux disease, and previous breast cancer    History of Present Illness     Migraine Headaches  She has a long history of migraine.  She has started on qulipta and feels that this has helped some with the frequency and severity.  She remains on botox injections. There has been no change in the quality of her headaches, nor any new associated symptoms.  She describes the pain as a unilateral or bilateral pounding generally preceded and associated with nausea.  Most headaches are also associated with photophobia and presbyphonia.  She continues to deny any changes in her vision, strength, or sensation.  She is using ubrelvy as needed and states she is generally functional within several hours.  She is scheduled to undergo another set of botox injections next week    Chronic Low Back Pain  She underwent a right L5-S1 microlumbar discectomy by Dr. Kauffman on 7/6/2022.  This was uncomplicated, and while she continues to have low back pain, there is no history of any further leg pain.  She continues to deny any changes in her bowel/bladder control    Fibromyalgia  She gives a long history of generalized muscle pain, difficulty sleeping, and fatigue.  There is no history of any joint stiffness, swelling, or redness, and she continues to deny any rash.    Chronic Anxiety with Depression  She gives a long history of intermittent nervousness, worrying, and difficulty sleeping.  When severe her symptoms have been associated with depression.  She is currently on sertraline and clomipramine.  She continues to deny any apparent side effects, and there is no history of any loss of interest in activities or suicidal  ideation    Hypothyroidism  She remains on levothyroxine 75 daily.  She admits to fatigue.      Gastroesophageal Reflux Disease  She reports a continued improvement in her heartburn and dysphagia to solids and liquids.  She continues to deny any divya vomiting or hematemesis, and there is no history of any abdominal pain, change in bowel habits, hematochezia, or melena.  She is currently on pantoprazole 40 daily, and continues to deny any NSAID use. She underwent an EGD by Dr. Clement on 6/2/2022 and was noted to have a small hiatal hernia, along with evidence of previous sleeve gastrectomy.  She underwent a reassessment with him on 3/20/2024 and an esophagram revealed a small sliding hiatal hernia, gastroesophageal reflux to the level of the mid esophagus, and occasional tertiary contractions.  She underwent an updated EGD by Dr. Saucedo on 8/21/2024 with evidence of a previous sleeve gastrectomy and antral gastritis.  Biopsies were consistent with mild chronic gastritis.  No H. pylori organisms were noted.  She is scheduled to undergo a reassessment with Morena Roberson PA-C on 11/21/2024    History of Breast Cancer  She is approximately 5 years post left total mastectomy with a level 1 and 2 axillary lymph node dissection following a positive sentinel node.  3 of 14 lymph nodes were positive.  She was eventually staged as 1A mpT2 pN1a Mo ER+ CA+ HER2 +.  She was treated with adjuvant chemotherapy and radiation therapy.  She was recently changed from tamoxifen to anastrozole.  She denies any side effects thus far.  She continues to be followed by  oncology, and is scheduled undergo a reassessment with Dr. Benson on 12/16/2024    Previous TLH/BSO  She underwent a TLH/BSO by Dr. Cardona at  on 7/17/2023.  This was uncomplicated and pathology was reported as showing adenomyosis and leiomyomas up to 1 cm in diameter.  She has noted a significant improvement in the frequency and severity of her hot flashes with  the change in tamoxifen to anastrozole and has been able to discontinue veozah altogether.    The following portions of the patient's history were reviewed and updated as appropriate: allergies, current medications, past medical history, past social history, and problem list.    Review of Systems   Constitutional:  Positive for fatigue. Negative for chills and fever.   HENT:  Negative for congestion, ear pain, rhinorrhea and sore throat.    Eyes:  Negative for visual disturbance.   Respiratory:  Negative for cough, shortness of breath and wheezing.    Cardiovascular:  Negative for chest pain, palpitations and leg swelling.   Gastrointestinal:  Positive for GERD. Negative for abdominal pain, blood in stool, constipation, diarrhea, nausea and vomiting.        Occasional dysphagia   Endocrine:        Hot flashes   Genitourinary:  Negative for dysuria and hematuria.   Musculoskeletal:  Positive for arthralgias, back pain, gait problem and myalgias. Negative for joint swelling.   Skin:  Negative for rash.   Neurological:  Positive for weakness (generalized), numbness and headache. Negative for dizziness.   Psychiatric/Behavioral:  Positive for sleep disturbance. Negative for suicidal ideas and depressed mood. The patient is nervous/anxious.      Objective   Physical Exam  Constitutional:       General: She is not in acute distress.     Appearance: Normal appearance. She is well-developed. She is not diaphoretic.      Comments: Bright and in good spirits. No apparent distress. No pallor, jaundice, diaphoresis, or cyanosis   HENT:      Head: Atraumatic.      Right Ear: Tympanic membrane, ear canal and external ear normal.      Left Ear: Tympanic membrane, ear canal and external ear normal.      Mouth/Throat:      Lips: No lesions.      Mouth: Mucous membranes are moist. No oral lesions.      Pharynx: No oropharyngeal exudate or posterior oropharyngeal erythema.   Eyes:      General: Lids are normal.      Extraocular  Movements: Extraocular movements intact.      Conjunctiva/sclera: Conjunctivae normal.      Pupils: Pupils are equal.   Neck:      Thyroid: No thyroid mass or thyromegaly.      Vascular: No carotid bruit or JVD.      Trachea: Trachea normal. No tracheal deviation.   Cardiovascular:      Rate and Rhythm: Normal rate and regular rhythm.      Heart sounds: Normal heart sounds, S1 normal and S2 normal. No murmur heard.     No gallop.   Pulmonary:      Effort: Pulmonary effort is normal.      Breath sounds: Normal breath sounds. No decreased breath sounds, wheezing, rhonchi or rales.   Abdominal:      General: Bowel sounds are normal. There is no distension.   Musculoskeletal:      Right lower leg: No edema.      Left lower leg: No edema.   Lymphadenopathy:      Head:      Right side of head: No submental, submandibular, tonsillar, preauricular, posterior auricular or occipital adenopathy.      Left side of head: No submental, submandibular, tonsillar, preauricular, posterior auricular or occipital adenopathy.      Cervical: No cervical adenopathy.      Upper Body:      Right upper body: No supraclavicular adenopathy.      Left upper body: No supraclavicular adenopathy.   Skin:     General: Skin is warm.      Coloration: Skin is not cyanotic, jaundiced or pale.      Findings: No rash.      Nails: There is no clubbing.   Neurological:      Mental Status: She is alert and oriented to person, place, and time.      Cranial Nerves: No cranial nerve deficit, dysarthria or facial asymmetry.      Sensory: No sensory deficit.      Motor: No tremor.      Coordination: Coordination normal.      Gait: Gait normal.   Psychiatric:         Attention and Perception: Attention normal.         Mood and Affect: Mood normal.         Speech: Speech normal.         Behavior: Behavior normal.         Thought Content: Thought content normal.       Assessment & Plan   Problems Addressed this Visit          Cardiac and Vasculature    Essential  hypertension   Hypertension:  BP remains at goal off medication . Evidence of target organ damage: none.  Encouraged to continue to work on diet and exercise plan.     Relevant Orders    CBC & Differential    Comprehensive Metabolic Panel    Lipid Panel       Endocrine and Metabolic    Vitamin D deficiency (Chronic)  Continue supplementation with monitoring.    Relevant Orders    Vitamin D,25-Hydroxy    Class 3 severe obesity with serious comorbidity and body mass index (BMI) of 40.0 to 44.9 in adult    History of sleeve gastrectomy    Hypothyroidism due to Hashimoto's thyroiditis  Clinically euthyroid.  Continue current medication.  Scheduled for updated labs just prior to her return.    Relevant Orders    TSH    Low vitamin B12 level  Continue supplementation with monitoring.    Relevant Orders    CBC & Differential    Vitamin B12       Gastrointestinal Abdominal     Chronic constipation  Reminded regarding lifestyle modification  Continue current medication  Follow up with GI     Relevant Orders    CBC & Differential    Gastroesophageal reflux disease without esophagitis  As above.   Continue current medication.  Follow up with GI     Relevant Orders    CBC & Differential    NAFLD (nonalcoholic fatty liver disease)    Relevant Orders    CBC & Differential    Comprehensive Metabolic Panel    Lipid Panel       Genitourinary and Reproductive     Menopausal symptom  Improved  Encouraged to report if this should change.       Health Encounters    Healthcare maintenance  Patient is scheduled to receive a flu shot through her work this week  Recommended an updated COVID-19 shot this fall  Reminded that she is due for an updated Tdap       Hematology and Neoplasia    Malignant neoplasm of left breast in female, estrogen receptor positive  Supportive therapy  Follow up with oncology    Relevant Medications    anastrozole (ARIMIDEX) 1 MG tablet       Mental Health    Anxiety with depression (Chronic)  Stable.  Supportive  therapy.   Continue current medication.  Encouraged to report if any worse or if any new symptoms or concerns.       Musculoskeletal and Injuries    Fibromyalgia  Reminded regarding symptomatic treatment.   Continue current medication    Generalized osteoarthrosis, involving multiple sites  As above.   Continue current medication.  Follow up with pain management       Neuro    Migraine without aura and without status migrainosus, not intractable  Chronic.  Stable.  Continue current medication  Follow up with neurology     Relevant Medications    ubrogepant (Ubrelvy) 100 MG tablet    Atogepant (Qulipta) 60 MG tablet    Other Relevant Orders    CBC & Differential     Diagnoses         Codes Comments    Essential hypertension    -  Primary ICD-10-CM: I10  ICD-9-CM: 401.9     Vitamin D deficiency     ICD-10-CM: E55.9  ICD-9-CM: 268.9     Low vitamin B12 level     ICD-10-CM: R79.89  ICD-9-CM: 790.6     Hypothyroidism due to Hashimoto's thyroiditis     ICD-10-CM: E06.3  ICD-9-CM: 245.2     History of sleeve gastrectomy     ICD-10-CM: Z90.3  ICD-9-CM: V15.29     Class 3 severe obesity with serious comorbidity and body mass index (BMI) of 40.0 to 44.9 in adult, unspecified obesity type     ICD-10-CM: E66.813, E66.01, Z68.41  ICD-9-CM: 278.01, V85.41     NAFLD (nonalcoholic fatty liver disease)     ICD-10-CM: K76.0  ICD-9-CM: 571.8     Gastroesophageal reflux disease without esophagitis     ICD-10-CM: K21.9  ICD-9-CM: 530.81     Chronic constipation     ICD-10-CM: K59.09  ICD-9-CM: 564.00     Menopausal symptom     ICD-10-CM: N95.1  ICD-9-CM: 627.2     Healthcare maintenance     ICD-10-CM: Z00.00  ICD-9-CM: V70.0     Malignant neoplasm of left breast in female, estrogen receptor positive, unspecified site of breast     ICD-10-CM: C50.912, Z17.0  ICD-9-CM: 174.9, V86.0     Anxiety with depression     ICD-10-CM: F41.8  ICD-9-CM: 300.4     Generalized osteoarthrosis, involving multiple sites     ICD-10-CM: M15.9  ICD-9-CM:  715.09     Fibromyalgia     ICD-10-CM: M79.7  ICD-9-CM: 729.1     Migraine without aura and without status migrainosus, not intractable     ICD-10-CM: G43.009  ICD-9-CM: 346.10           I spent 46 minutes caring for Briana Ugarte on this date of service. This time includes time spent by me in the following activities:reviewing tests, performing a medically appropriate examination and/or evaluation , counseling and educating the patient/family/caregiver, ordering medications, tests, or procedures and documenting information in the medical record

## 2024-10-25 ENCOUNTER — TELEPHONE (OUTPATIENT)
Dept: FAMILY MEDICINE CLINIC | Facility: CLINIC | Age: 49
End: 2024-10-25

## 2024-10-25 NOTE — TELEPHONE ENCOUNTER
Caller: Briana Ugarte    Relationship to patient: Self    Best call back number: [phone]     Chief complaint: 3RD MIGRAINE THIS WEEK    Type of visit: INJECTION    Requested date: TODAY  AS SOON AS POSSIBLE    Additional notes: PLEASE CALL TO SCHEDULE INJECTION FOR MIGRAINE TODAY

## 2024-11-01 RX ORDER — CYANOCOBALAMIN 1000 UG/ML
INJECTION, SOLUTION INTRAMUSCULAR; SUBCUTANEOUS
Qty: 1 ML | Refills: 5 | Status: SHIPPED | OUTPATIENT
Start: 2024-11-01

## 2024-11-07 ENCOUNTER — OFFICE VISIT (OUTPATIENT)
Dept: FAMILY MEDICINE CLINIC | Facility: CLINIC | Age: 49
End: 2024-11-07
Payer: COMMERCIAL

## 2024-11-07 VITALS
BODY MASS INDEX: 39.53 KG/M2 | TEMPERATURE: 98.6 F | WEIGHT: 246 LBS | RESPIRATION RATE: 14 BRPM | HEIGHT: 66 IN | HEART RATE: 100 BPM | OXYGEN SATURATION: 97 % | DIASTOLIC BLOOD PRESSURE: 62 MMHG | SYSTOLIC BLOOD PRESSURE: 125 MMHG

## 2024-11-07 DIAGNOSIS — G43.009 MIGRAINE WITHOUT AURA AND WITHOUT STATUS MIGRAINOSUS, NOT INTRACTABLE: ICD-10-CM

## 2024-11-07 DIAGNOSIS — R79.89 LOW VITAMIN B12 LEVEL: ICD-10-CM

## 2024-11-07 DIAGNOSIS — I10 ESSENTIAL HYPERTENSION: ICD-10-CM

## 2024-11-07 DIAGNOSIS — K21.9 GASTROESOPHAGEAL REFLUX DISEASE WITHOUT ESOPHAGITIS: ICD-10-CM

## 2024-11-07 DIAGNOSIS — E06.3 HYPOTHYROIDISM DUE TO HASHIMOTO'S THYROIDITIS: ICD-10-CM

## 2024-11-07 DIAGNOSIS — E55.9 VITAMIN D DEFICIENCY: Primary | Chronic | ICD-10-CM

## 2024-11-07 DIAGNOSIS — K76.0 NAFLD (NONALCOHOLIC FATTY LIVER DISEASE): ICD-10-CM

## 2024-11-07 DIAGNOSIS — F41.8 ANXIETY WITH DEPRESSION: Chronic | ICD-10-CM

## 2024-11-07 DIAGNOSIS — E55.9 VITAMIN D DEFICIENCY: Chronic | ICD-10-CM

## 2024-11-07 DIAGNOSIS — H69.93 DYSFUNCTION OF BOTH EUSTACHIAN TUBES: Primary | ICD-10-CM

## 2024-11-07 DIAGNOSIS — K59.09 CHRONIC CONSTIPATION: ICD-10-CM

## 2024-11-07 LAB
25(OH)D3 SERPL-MCNC: 16.3 NG/ML (ref 30–100)
ALBUMIN SERPL-MCNC: 4.2 G/DL (ref 3.5–5.2)
ALBUMIN/GLOB SERPL: 1.3 G/DL
ALP SERPL-CCNC: 117 U/L (ref 39–117)
ALT SERPL W P-5'-P-CCNC: 24 U/L (ref 1–33)
ANION GAP SERPL CALCULATED.3IONS-SCNC: 11.2 MMOL/L (ref 5–15)
AST SERPL-CCNC: 28 U/L (ref 1–32)
BASOPHILS # BLD AUTO: 0.07 10*3/MM3 (ref 0–0.2)
BASOPHILS NFR BLD AUTO: 0.9 % (ref 0–1.5)
BILIRUB SERPL-MCNC: 0.3 MG/DL (ref 0–1.2)
BUN SERPL-MCNC: 8 MG/DL (ref 6–20)
BUN/CREAT SERPL: 9.4 (ref 7–25)
CALCIUM SPEC-SCNC: 9.6 MG/DL (ref 8.6–10.5)
CHLORIDE SERPL-SCNC: 103 MMOL/L (ref 98–107)
CHOLEST SERPL-MCNC: 190 MG/DL (ref 0–200)
CO2 SERPL-SCNC: 27.8 MMOL/L (ref 22–29)
CREAT SERPL-MCNC: 0.85 MG/DL (ref 0.57–1)
CRP SERPL-MCNC: 1.34 MG/DL (ref 0–0.5)
DEPRECATED RDW RBC AUTO: 44.1 FL (ref 37–54)
EGFRCR SERPLBLD CKD-EPI 2021: 85.2 ML/MIN/1.73
EOSINOPHIL # BLD AUTO: 0.27 10*3/MM3 (ref 0–0.4)
EOSINOPHIL NFR BLD AUTO: 3.5 % (ref 0.3–6.2)
ERYTHROCYTE [DISTWIDTH] IN BLOOD BY AUTOMATED COUNT: 14.6 % (ref 12.3–15.4)
ERYTHROCYTE [SEDIMENTATION RATE] IN BLOOD: 15 MM/HR (ref 0–20)
GLOBULIN UR ELPH-MCNC: 3.3 GM/DL
GLUCOSE SERPL-MCNC: 74 MG/DL (ref 65–99)
HCT VFR BLD AUTO: 41.8 % (ref 34–46.6)
HDLC SERPL-MCNC: 77 MG/DL (ref 40–60)
HGB BLD-MCNC: 13 G/DL (ref 12–15.9)
IMM GRANULOCYTES # BLD AUTO: 0.04 10*3/MM3 (ref 0–0.05)
IMM GRANULOCYTES NFR BLD AUTO: 0.5 % (ref 0–0.5)
LDLC SERPL CALC-MCNC: 99 MG/DL (ref 0–100)
LDLC/HDLC SERPL: 1.27 {RATIO}
LYMPHOCYTES # BLD AUTO: 2.44 10*3/MM3 (ref 0.7–3.1)
LYMPHOCYTES NFR BLD AUTO: 31.5 % (ref 19.6–45.3)
MCH RBC QN AUTO: 25.5 PG (ref 26.6–33)
MCHC RBC AUTO-ENTMCNC: 31.1 G/DL (ref 31.5–35.7)
MCV RBC AUTO: 82.1 FL (ref 79–97)
MONOCYTES # BLD AUTO: 0.47 10*3/MM3 (ref 0.1–0.9)
MONOCYTES NFR BLD AUTO: 6.1 % (ref 5–12)
NEUTROPHILS NFR BLD AUTO: 4.46 10*3/MM3 (ref 1.7–7)
NEUTROPHILS NFR BLD AUTO: 57.5 % (ref 42.7–76)
NRBC BLD AUTO-RTO: 0 /100 WBC (ref 0–0.2)
PLATELET # BLD AUTO: 406 10*3/MM3 (ref 140–450)
PMV BLD AUTO: 10.1 FL (ref 6–12)
POTASSIUM SERPL-SCNC: 4.3 MMOL/L (ref 3.5–5.2)
PROT SERPL-MCNC: 7.5 G/DL (ref 6–8.5)
RBC # BLD AUTO: 5.09 10*6/MM3 (ref 3.77–5.28)
SODIUM SERPL-SCNC: 142 MMOL/L (ref 136–145)
TRIGL SERPL-MCNC: 76 MG/DL (ref 0–150)
TSH SERPL DL<=0.05 MIU/L-ACNC: 3.04 UIU/ML (ref 0.27–4.2)
VIT B12 BLD-MCNC: 569 PG/ML (ref 211–946)
VLDLC SERPL-MCNC: 14 MG/DL (ref 5–40)
WBC NRBC COR # BLD AUTO: 7.75 10*3/MM3 (ref 3.4–10.8)

## 2024-11-07 PROCEDURE — 82607 VITAMIN B-12: CPT | Performed by: GENERAL PRACTICE

## 2024-11-07 PROCEDURE — 80050 GENERAL HEALTH PANEL: CPT | Performed by: GENERAL PRACTICE

## 2024-11-07 PROCEDURE — 80061 LIPID PANEL: CPT | Performed by: GENERAL PRACTICE

## 2024-11-07 PROCEDURE — 99214 OFFICE O/P EST MOD 30 MIN: CPT | Performed by: GENERAL PRACTICE

## 2024-11-07 PROCEDURE — 86140 C-REACTIVE PROTEIN: CPT | Performed by: GENERAL PRACTICE

## 2024-11-07 PROCEDURE — 82306 VITAMIN D 25 HYDROXY: CPT | Performed by: GENERAL PRACTICE

## 2024-11-07 PROCEDURE — 85652 RBC SED RATE AUTOMATED: CPT | Performed by: GENERAL PRACTICE

## 2024-11-07 PROCEDURE — 36415 COLL VENOUS BLD VENIPUNCTURE: CPT | Performed by: GENERAL PRACTICE

## 2024-11-07 RX ORDER — ERGOCALCIFEROL 1.25 MG/1
50000 CAPSULE, LIQUID FILLED ORAL WEEKLY
Qty: 4 CAPSULE | Refills: 5 | Status: SHIPPED | OUTPATIENT
Start: 2024-11-07

## 2024-11-07 RX ORDER — SERTRALINE HYDROCHLORIDE 100 MG/1
150 TABLET, FILM COATED ORAL DAILY
Qty: 45 TABLET | Refills: 5 | Status: SHIPPED | OUTPATIENT
Start: 2024-11-07

## 2024-11-07 RX ORDER — IBUPROFEN 600 MG/1
600 TABLET, FILM COATED ORAL 3 TIMES DAILY PRN
Qty: 90 TABLET | Refills: 0 | Status: SHIPPED | OUTPATIENT
Start: 2024-11-07

## 2024-11-07 RX ORDER — CLOMIPRAMINE HYDROCHLORIDE 25 MG/1
50 CAPSULE ORAL NIGHTLY
Qty: 60 CAPSULE | Refills: 5 | Status: SHIPPED | OUTPATIENT
Start: 2024-11-07

## 2024-11-07 NOTE — PROGRESS NOTES
"Subjective   Briana Ugarte is a 49 y.o. female.     Chief Complaint  Ear pain    History of Present Illness     Ear Pain  She gives a nearly 6 week history of bilateral ear pain.  This is described as a sharp ache worse on the left, and has been associated with hearing loss.  When one or both years \"pop\", her symptoms improve only to recur after a few minutes.  Her symptoms were initially associated with nasal congestion and sinus pressure.  She continues to run a fever on and off.  She denies any other Pittore tract symptoms at present, there is no history of any cough or shortness of breath.  She admits to occasional nausea and diarrhea but denies any vomiting, or abdominal pain.  There is no history of any dysuria or hematuria and she denies any rash.  She completed a course of cefdinir followed by amoxicillin/clavulanate with little change.    The following portions of the patient's history were reviewed and updated as appropriate: allergies, current medications, past medical history, and problem list.    Review of Systems   Constitutional:  Positive for fatigue. Negative for chills and fever.   HENT:  Positive for ear pain. Negative for congestion, hearing loss, rhinorrhea, sore throat and tinnitus.    Eyes:  Negative for visual disturbance.   Respiratory:  Negative for cough, shortness of breath and wheezing.    Cardiovascular:  Negative for chest pain, palpitations and leg swelling.   Gastrointestinal:  Positive for GERD. Negative for abdominal pain, blood in stool, constipation, diarrhea, nausea and vomiting.        Occasional dysphagia   Endocrine:        Hot flashes   Genitourinary:  Negative for dysuria and hematuria.   Musculoskeletal:  Positive for arthralgias, back pain, gait problem, myalgias and neck pain. Negative for joint swelling.   Skin:  Negative for rash.   Neurological:  Positive for dizziness, weakness (generalized), numbness and headache.   Hematological:  Positive for adenopathy. "   Psychiatric/Behavioral:  Positive for sleep disturbance. Negative for suicidal ideas and depressed mood. The patient is nervous/anxious.      Objective   Physical Exam  Constitutional:       General: She is not in acute distress.     Appearance: Normal appearance. She is well-developed. She is not diaphoretic.      Comments: Bright and in fair spirits. No apparent distress. No pallor, jaundice, diaphoresis, or cyanosis   HENT:      Head: Atraumatic.      Right Ear: Tympanic membrane, ear canal and external ear normal.      Left Ear: Tympanic membrane, ear canal and external ear normal.      Mouth/Throat:      Lips: No lesions.      Mouth: Mucous membranes are moist. No oral lesions.      Pharynx: No oropharyngeal exudate or posterior oropharyngeal erythema.   Eyes:      General: Lids are normal.      Extraocular Movements: Extraocular movements intact.      Conjunctiva/sclera: Conjunctivae normal.      Pupils: Pupils are equal.   Neck:      Thyroid: No thyroid mass or thyromegaly.      Vascular: No carotid bruit or JVD.      Trachea: Trachea normal. No tracheal deviation.   Cardiovascular:      Rate and Rhythm: Normal rate and regular rhythm.      Heart sounds: Normal heart sounds, S1 normal and S2 normal. No murmur heard.     No gallop.   Pulmonary:      Effort: Pulmonary effort is normal.      Breath sounds: Normal breath sounds. No decreased breath sounds, wheezing, rhonchi or rales.   Abdominal:      General: Bowel sounds are normal. There is no distension.   Musculoskeletal:      Right lower leg: No edema.      Left lower leg: No edema.   Lymphadenopathy:      Head:      Right side of head: No submental, submandibular, tonsillar, preauricular, posterior auricular or occipital adenopathy.      Left side of head: No submental, submandibular, tonsillar, preauricular, posterior auricular or occipital adenopathy.      Cervical: No cervical adenopathy.      Upper Body:      Right upper body: No supraclavicular  adenopathy.      Left upper body: No supraclavicular adenopathy.   Skin:     General: Skin is warm.      Coloration: Skin is not cyanotic, jaundiced or pale.      Findings: No rash.      Nails: There is no clubbing.   Neurological:      Mental Status: She is alert and oriented to person, place, and time.      Cranial Nerves: No cranial nerve deficit, dysarthria or facial asymmetry.      Sensory: No sensory deficit.      Motor: No tremor.      Coordination: Coordination normal.      Gait: Gait normal.   Psychiatric:         Attention and Perception: Attention normal.         Mood and Affect: Mood normal.         Speech: Speech normal.         Behavior: Behavior normal.         Thought Content: Thought content normal.       Assessment & Plan   Problems Addressed this Visit          Cardiac and Vasculature    Essential hypertension (Chronic)   Hypertension:  BP remains at goal off medication .   Will continue to monitor       ENT    Dysfunction of both eustachian tubes  Post UTRI  Recommended a nasal corticosteroid consistently until her symptoms resolve  Referral to ENT in case this does not happen over the next month  Encouraged to report if any worse or if any new symptoms or concerns.    Relevant Medications    ibuprofen (ADVIL,MOTRIN) 600 MG tablet    Other Relevant Orders    CBC & Differential    Sedimentation Rate (Completed)    C-reactive Protein (Completed)    Ambulatory Referral to ENT (Otolaryngology)       Endocrine and Metabolic    Vitamin D deficiency (Chronic)    Hypothyroidism due to Hashimoto's thyroiditis     Clinically euthyroid.   Continue current medication.        Low vitamin B12 level       Gastrointestinal Abdominal     Chronic constipation    Gastroesophageal reflux disease without esophagitis    NAFLD (nonalcoholic fatty liver disease)       Mental Health    Anxiety with depression (Chronic)  Stable.  Supportive therapy.   Continue current medication.    Relevant Medications    sertraline  (ZOLOFT) 100 MG tablet    clomiPRAMINE (Anafranil) 25 MG capsule    Other Relevant Orders    PHARMACOGENOMICS PROFILE, ACTX - Swab,       Neuro    Migraine without aura and without status migrainosus, not intractable  Continue current medication  Follow up with neurology     Relevant Medications    ibuprofen (ADVIL,MOTRIN) 600 MG tablet    sertraline (ZOLOFT) 100 MG tablet    clomiPRAMINE (Anafranil) 25 MG capsule     Diagnoses         Codes Comments    Dysfunction of both eustachian tubes    -  Primary ICD-10-CM: H69.93  ICD-9-CM: 381.81     Anxiety with depression     ICD-10-CM: F41.8  ICD-9-CM: 300.4     Essential hypertension     ICD-10-CM: I10  ICD-9-CM: 401.9     Low vitamin B12 level     ICD-10-CM: R79.89  ICD-9-CM: 790.6     NAFLD (nonalcoholic fatty liver disease)     ICD-10-CM: K76.0  ICD-9-CM: 571.8     Gastroesophageal reflux disease without esophagitis     ICD-10-CM: K21.9  ICD-9-CM: 530.81     Chronic constipation     ICD-10-CM: K59.09  ICD-9-CM: 564.00     Migraine without aura and without status migrainosus, not intractable     ICD-10-CM: G43.009  ICD-9-CM: 346.10     Hypothyroidism due to Hashimoto's thyroiditis     ICD-10-CM: E06.3  ICD-9-CM: 245.2     Vitamin D deficiency     ICD-10-CM: E55.9  ICD-9-CM: 268.9           I spent 30 minutes caring for Briana Ugarte on this date of service. This time includes time spent by me in the following activities:reviewing tests, performing a medically appropriate examination and/or evaluation , counseling and educating the patient/family/caregiver, ordering medications, tests, or procedures and documenting information in the medical record

## 2024-11-08 NOTE — PROGRESS NOTES
Sent [employer] message    -- Patient needs to start on vitamin D 77523 IU weekly. I have emailed a script to pharmacy.

## 2024-12-05 DIAGNOSIS — F41.8 ANXIETY WITH DEPRESSION: Primary | Chronic | ICD-10-CM

## 2024-12-05 RX ORDER — QUETIAPINE FUMARATE 50 MG/1
TABLET, FILM COATED ORAL
Qty: 60 TABLET | Refills: 5 | Status: SHIPPED | OUTPATIENT
Start: 2024-12-05

## 2024-12-27 ENCOUNTER — TELEPHONE (OUTPATIENT)
Dept: FAMILY MEDICINE CLINIC | Facility: CLINIC | Age: 49
End: 2024-12-27

## 2024-12-27 ENCOUNTER — CLINICAL SUPPORT (OUTPATIENT)
Dept: FAMILY MEDICINE CLINIC | Facility: CLINIC | Age: 49
End: 2024-12-27
Payer: COMMERCIAL

## 2024-12-27 DIAGNOSIS — R39.9 LOWER URINARY TRACT SYMPTOMS (LUTS): Primary | ICD-10-CM

## 2024-12-27 LAB
BILIRUB BLD-MCNC: NEGATIVE MG/DL
CLARITY, POC: ABNORMAL
COLOR UR: YELLOW
EXPIRATION DATE: ABNORMAL
GLUCOSE UR STRIP-MCNC: NEGATIVE MG/DL
KETONES UR QL: NEGATIVE
LEUKOCYTE EST, POC: ABNORMAL
Lab: ABNORMAL
NITRITE UR-MCNC: NEGATIVE MG/ML
PH UR: 8 [PH] (ref 5–8)
PROT UR STRIP-MCNC: ABNORMAL MG/DL
RBC # UR STRIP: ABNORMAL /UL
SP GR UR: 1.01 (ref 1–1.03)
UROBILINOGEN UR QL: ABNORMAL

## 2024-12-27 PROCEDURE — 87086 URINE CULTURE/COLONY COUNT: CPT | Performed by: NURSE PRACTITIONER

## 2024-12-27 PROCEDURE — 81003 URINALYSIS AUTO W/O SCOPE: CPT | Performed by: NURSE PRACTITIONER

## 2024-12-27 RX ORDER — NITROFURANTOIN 25; 75 MG/1; MG/1
100 CAPSULE ORAL 2 TIMES DAILY
Qty: 20 CAPSULE | Refills: 0 | Status: SHIPPED | OUTPATIENT
Start: 2024-12-27

## 2024-12-27 NOTE — TELEPHONE ENCOUNTER
Sent patient a message via [employer].      ----- Message from Chandrika Lawson sent at 12/27/2024  1:36 PM EST -----  Regarding: UTI  Please let her know that I did send in Macrobid for her to begin.  Her urine will be sent for culture and I will keep her posted regarding the results of that which will probably not be until Sunday.  ----- Message -----  From: Yesy Verdugo MA  Sent: 12/27/2024   1:27 PM EST  To: LAKISHA Foster

## 2024-12-29 LAB — BACTERIA SPEC AEROBE CULT: NO GROWTH

## 2025-02-12 DIAGNOSIS — K21.00 GASTROESOPHAGEAL REFLUX DISEASE WITH ESOPHAGITIS: ICD-10-CM

## 2025-02-12 RX ORDER — OMEPRAZOLE 40 MG/1
40 CAPSULE, DELAYED RELEASE ORAL 2 TIMES DAILY
Qty: 180 CAPSULE | Refills: 3 | OUTPATIENT
Start: 2025-02-12

## 2025-02-21 ENCOUNTER — OFFICE VISIT (OUTPATIENT)
Dept: FAMILY MEDICINE CLINIC | Facility: CLINIC | Age: 50
End: 2025-02-21
Payer: COMMERCIAL

## 2025-02-21 VITALS
SYSTOLIC BLOOD PRESSURE: 125 MMHG | RESPIRATION RATE: 14 BRPM | WEIGHT: 253 LBS | BODY MASS INDEX: 40.66 KG/M2 | OXYGEN SATURATION: 98 % | HEIGHT: 66 IN | HEART RATE: 100 BPM | DIASTOLIC BLOOD PRESSURE: 63 MMHG | TEMPERATURE: 98.6 F

## 2025-02-21 DIAGNOSIS — K21.9 GASTROESOPHAGEAL REFLUX DISEASE WITHOUT ESOPHAGITIS: ICD-10-CM

## 2025-02-21 DIAGNOSIS — I10 ESSENTIAL HYPERTENSION: Primary | Chronic | ICD-10-CM

## 2025-02-21 DIAGNOSIS — M26.621 ARTHRALGIA OF RIGHT TEMPOROMANDIBULAR JOINT: ICD-10-CM

## 2025-02-21 DIAGNOSIS — J30.9 ALLERGIC RHINITIS, UNSPECIFIED SEASONALITY, UNSPECIFIED TRIGGER: ICD-10-CM

## 2025-02-21 DIAGNOSIS — R79.89 LOW VITAMIN B12 LEVEL: ICD-10-CM

## 2025-02-21 DIAGNOSIS — Z00.00 HEALTHCARE MAINTENANCE: ICD-10-CM

## 2025-02-21 DIAGNOSIS — F41.8 ANXIETY WITH DEPRESSION: Chronic | ICD-10-CM

## 2025-02-21 DIAGNOSIS — M79.7 FIBROMYALGIA: ICD-10-CM

## 2025-02-21 DIAGNOSIS — K59.09 CHRONIC CONSTIPATION: ICD-10-CM

## 2025-02-21 DIAGNOSIS — Z90.3 HISTORY OF SLEEVE GASTRECTOMY: ICD-10-CM

## 2025-02-21 DIAGNOSIS — K76.0 NAFLD (NONALCOHOLIC FATTY LIVER DISEASE): ICD-10-CM

## 2025-02-21 DIAGNOSIS — C50.912 MALIGNANT NEOPLASM OF LEFT BREAST IN FEMALE, ESTROGEN RECEPTOR POSITIVE, UNSPECIFIED SITE OF BREAST: ICD-10-CM

## 2025-02-21 DIAGNOSIS — M15.9 GENERALIZED OSTEOARTHROSIS, INVOLVING MULTIPLE SITES: ICD-10-CM

## 2025-02-21 DIAGNOSIS — E06.3 HYPOTHYROIDISM DUE TO HASHIMOTO'S THYROIDITIS: ICD-10-CM

## 2025-02-21 DIAGNOSIS — E55.9 VITAMIN D DEFICIENCY: Chronic | ICD-10-CM

## 2025-02-21 DIAGNOSIS — E66.813 CLASS 3 SEVERE OBESITY WITH SERIOUS COMORBIDITY AND BODY MASS INDEX (BMI) OF 40.0 TO 44.9 IN ADULT, UNSPECIFIED OBESITY TYPE: ICD-10-CM

## 2025-02-21 DIAGNOSIS — Z17.0 MALIGNANT NEOPLASM OF LEFT BREAST IN FEMALE, ESTROGEN RECEPTOR POSITIVE, UNSPECIFIED SITE OF BREAST: ICD-10-CM

## 2025-02-21 DIAGNOSIS — E66.01 CLASS 3 SEVERE OBESITY WITH SERIOUS COMORBIDITY AND BODY MASS INDEX (BMI) OF 40.0 TO 44.9 IN ADULT, UNSPECIFIED OBESITY TYPE: ICD-10-CM

## 2025-02-21 PROBLEM — H69.93 DYSFUNCTION OF BOTH EUSTACHIAN TUBES: Status: RESOLVED | Noted: 2024-11-07 | Resolved: 2025-02-21

## 2025-02-21 NOTE — PROGRESS NOTES
Subjective   Briana Ugarte is a 49 y.o. female.     Chief Complaint  She returns for a scheduled reassessment of multiple medical problems including migraine headache without aura, chronic low back pain, fibromyalgia, chronic anxiety with depression, hypothyroidism, gastroesophageal reflux disease, previous breast cancer, and right jaw pain    History of Present Illness     Right Jaw Pain  She gives an approximate 1 month history of intermittent pain and crepitus about her right jaw joint with chewing.  She denies any other associated symptoms.  She denies any further ear discomfort.  She gives no history of previous.    Migraine Headaches  She has a long history of migraine.  She remains on qulipta with botox injections. There has been no change in the quality of her headaches, nor any new associated symptoms.  She describes the pain as a unilateral or bilateral pounding generally preceded and associated with nausea.  Most headaches are also associated with photophobia and presbyphonia.  She continues to deny any changes in her vision, strength, or sensation.  She is using ubrelvy as needed and states she is generally functional within several hours.    Lab Results   Component Value Date    WBC 7.75 11/07/2024    HGB 13.0 11/07/2024    HCT 41.8 11/07/2024    MCV 82.1 11/07/2024     11/07/2024     Chronic Low Back Pain  She underwent a right L5-S1 microlumbar discectomy by Dr. Kauffman on 7/6/2022.  This was uncomplicated, and while she continues to have low back pain, there is no history of any further leg pain.  She continues to deny any changes in her bowel/bladder control    Fibromyalgia  She gives a long history of generalized muscle pain, difficulty sleeping, and fatigue.  There is no history of any joint stiffness, swelling, or redness, and she continues to deny any rash.  Lab Results   Component Value Date    SEDRATE 15 11/07/2024     Lab Results   Component Value Date    CRP 1.34 (H)  11/07/2024     Chronic Anxiety with Depression  She gives a long history of intermittent nervousness, worrying, and difficulty sleeping.  When severe her symptoms have been associated with depression.  She is currently on sertraline and clomipramine.  She continues to deny any apparent side effects, and there is no history of any loss of interest in activities or suicidal ideation    Hypothyroidism  She remains on levothyroxine 75 daily.  She admits to fatigue.    Lab Results   Component Value Date    TSH 3.040 11/07/2024     Gastroesophageal Reflux Disease  She reports a continued improvement in her heartburn and dysphagia to solids and liquids.  She continues to deny any divya vomiting or hematemesis, and there is no history of any abdominal pain, change in bowel habits, hematochezia, or melena.  She is currently on pantoprazole 40 daily, and continues to deny any NSAID use. She underwent an EGD by Dr. Clement on 6/2/2022 and was noted to have a small hiatal hernia, along with evidence of previous sleeve gastrectomy.  She underwent a reassessment with him on 3/20/2024 and an esophagram revealed a small sliding hiatal hernia, gastroesophageal reflux to the level of the mid esophagus, and occasional tertiary contractions.  She underwent an updated EGD by Dr. Saucedo on 8/21/2024 with evidence of a previous sleeve gastrectomy and antral gastritis.  Biopsies were consistent with mild chronic gastritis.  No H. pylori organisms were noted.  She was to undergo a reassessment with Morena Roberson PA-C on 11/21/2024 but missed this and has yet to reschedule    History of Breast Cancer  She is approximately 5 years post left total mastectomy with a level 1 and 2 axillary lymph node dissection following a positive sentinel node.  3 of 14 lymph nodes were positive.  She was eventually staged as 1A mpT2 pN1a Mo ER+ PA+ HER2 +.  She was treated with adjuvant chemotherapy and radiation therapy.  She was recently changed from  tamoxifen to anastrozole.  She denies any side effects thus far.  She continues to be followed by  oncology, and is scheduled undergo a reassessment with Dr. Benson on 6/18/2025    Previous TLH/BSO  She underwent a TLH/BSO by Dr. Cardona at  on 7/17/2023.  This was uncomplicated and pathology was reported as showing adenomyosis and leiomyomas up to 1 cm in diameter.  She has noted a significant improvement in the frequency and severity of her hot flashes with the change in tamoxifen to anastrozole    Labs  Most recent vitamin D 16.3.  She was started on high-dose supplementation afterward  Lab Results   Component Value Date    GLUCOSE 74 11/07/2024    BUN 8 11/07/2024    CREATININE 0.85 11/07/2024     11/07/2024    K 4.3 11/07/2024     11/07/2024    CALCIUM 9.6 11/07/2024    PROTEINTOT 7.5 11/07/2024    ALBUMIN 4.2 11/07/2024    ALT 24 11/07/2024    AST 28 11/07/2024    ALKPHOS 117 11/07/2024    BILITOT 0.3 11/07/2024    GLOB 3.3 11/07/2024    AGRATIO 1.3 11/07/2024    BCR 9.4 11/07/2024    ANIONGAP 11.2 11/07/2024    EGFR 85.2 11/07/2024     Lab Results   Component Value Date    CHOL 190 11/07/2024    CHLPL 170 10/09/2017    TRIG 76 11/07/2024    HDL 77 (H) 11/07/2024    LDL 99 11/07/2024     Lab Results   Component Value Date    [id number] 569 11/07/2024     The following portions of the patient's history were reviewed and updated as appropriate: allergies, current medications, past medical history, past social history, and problem list.    Review of Systems   Constitutional:  Positive for fatigue. Negative for chills and fever.   HENT:  Negative for congestion, ear pain, hearing loss, rhinorrhea, sore throat and tinnitus.    Eyes:  Negative for visual disturbance.   Respiratory:  Negative for cough, shortness of breath and wheezing.    Cardiovascular:  Negative for chest pain, palpitations and leg swelling.   Gastrointestinal:  Positive for GERD. Negative for abdominal pain, blood in stool,  constipation, diarrhea, nausea and vomiting.        Occasional dysphagia   Endocrine:        Hot flashes   Genitourinary:  Negative for dysuria and hematuria.   Musculoskeletal:  Positive for arthralgias, back pain, gait problem, myalgias and neck pain. Negative for joint swelling.   Skin:  Negative for rash.   Neurological:  Positive for dizziness, weakness (generalized), numbness and headache.   Hematological:  Positive for adenopathy.   Psychiatric/Behavioral:  Positive for sleep disturbance. Negative for suicidal ideas and depressed mood. The patient is nervous/anxious.      Objective   Physical Exam  Constitutional:       General: She is not in acute distress.     Appearance: Normal appearance. She is well-developed. She is not diaphoretic.      Comments: Bright and in fair spirits. No apparent distress. No pallor, jaundice, diaphoresis, or cyanosis   HENT:      Head: Atraumatic.      Jaw: Tenderness (about right TMJ) present. No swelling.      Right Ear: Tympanic membrane, ear canal and external ear normal.      Left Ear: Tympanic membrane, ear canal and external ear normal.      Mouth/Throat:      Lips: No lesions.      Mouth: Mucous membranes are moist. No oral lesions.      Pharynx: No oropharyngeal exudate or posterior oropharyngeal erythema.   Eyes:      General: Lids are normal.      Extraocular Movements: Extraocular movements intact.      Conjunctiva/sclera: Conjunctivae normal.      Pupils: Pupils are equal.   Neck:      Thyroid: No thyroid mass or thyromegaly.      Vascular: No carotid bruit or JVD.      Trachea: Trachea normal. No tracheal deviation.   Cardiovascular:      Rate and Rhythm: Normal rate and regular rhythm.      Heart sounds: Normal heart sounds, S1 normal and S2 normal. No murmur heard.     No gallop.   Pulmonary:      Effort: Pulmonary effort is normal.      Breath sounds: Normal breath sounds. No decreased breath sounds, wheezing, rhonchi or rales.   Abdominal:      General: Bowel  sounds are normal. There is no distension.   Musculoskeletal:      Right lower leg: No edema.      Left lower leg: No edema.   Lymphadenopathy:      Head:      Right side of head: No submental, submandibular, tonsillar, preauricular, posterior auricular or occipital adenopathy.      Left side of head: No submental, submandibular, tonsillar, preauricular, posterior auricular or occipital adenopathy.      Cervical: No cervical adenopathy.      Upper Body:      Right upper body: No supraclavicular adenopathy.      Left upper body: No supraclavicular adenopathy.   Skin:     General: Skin is warm.      Coloration: Skin is not cyanotic, jaundiced or pale.      Findings: No rash.      Nails: There is no clubbing.   Neurological:      Mental Status: She is alert and oriented to person, place, and time.      Cranial Nerves: No cranial nerve deficit, dysarthria or facial asymmetry.      Sensory: No sensory deficit.      Motor: No tremor.      Coordination: Coordination normal.      Gait: Gait normal.   Psychiatric:         Attention and Perception: Attention normal.         Mood and Affect: Mood normal.         Speech: Speech normal.         Behavior: Behavior normal.         Thought Content: Thought content normal.       Assessment & Plan   Problems Addressed this Visit          Cardiac and Vasculature    Essential hypertension    Hypertension:  BP remains at goal off medication with lifestyle modification and weight loss . Evidence of target organ damage: none.  Encouraged to continue to work on diet and exercise plan.        ENT    Arthralgia of right temporomandibular joint  Encouraged to avoid hard foods and repetitive chewing and to report if any worse, any new symptoms, or if not improving over the next month       Endocrine and Metabolic    Vitamin D deficiency (Chronic)  Continue supplementation with monitoring.    Class 3 severe obesity with serious comorbidity and body mass index (BMI) of 40.0 to 44.9 in adult  As  above.    History of sleeve gastrectomy    Hypothyroidism due to Hashimoto's thyroiditis  Clinically and bio-chemically euthyroid.  Continue current medication.    Low vitamin B12 level  Continue supplementation with monitoring.       Gastrointestinal Abdominal     Chronic constipation    Gastroesophageal reflux disease without esophagitis  Reminded regarding lifestyle modification  Continue current medication  Encouraged to report if any worse or if any new symptoms or concerns.    NAFLD (nonalcoholic fatty liver disease)       Health Encounters    Healthcare maintenance  Recommended an updated COVID-19 shot       Hematology and Neoplasia    Malignant neoplasm of left breast in female, estrogen receptor positive  Supportive therapy  Continue current medication  Follow up with updated mammogram and oncology       Mental Health    Anxiety with depression (Chronic)  Stable.  Supportive therapy.   Continue current medication.  Encouraged to report if any worse or if any new symptoms or concerns.       Musculoskeletal and Injuries    Fibromyalgia  Reminded regarding symptomatic treatment.   Continue current medication    Generalized osteoarthrosis, involving multiple sites  As above.  Follow up with pain management     Diagnoses         Codes Comments    Essential hypertension    -  Primary ICD-10-CM: I10  ICD-9-CM: 401.9     Vitamin D deficiency     ICD-10-CM: E55.9  ICD-9-CM: 268.9     Low vitamin B12 level     ICD-10-CM: R79.89  ICD-9-CM: 790.6     Hypothyroidism due to Hashimoto's thyroiditis     ICD-10-CM: E06.3  ICD-9-CM: 245.2     History of sleeve gastrectomy     ICD-10-CM: Z90.3  ICD-9-CM: V15.29     Class 3 severe obesity with serious comorbidity and body mass index (BMI) of 40.0 to 44.9 in adult, unspecified obesity type     ICD-10-CM: E66.813, E66.01, Z68.41  ICD-9-CM: 278.01, V85.41     NAFLD (nonalcoholic fatty liver disease)     ICD-10-CM: K76.0  ICD-9-CM: 571.8     Gastroesophageal reflux disease without  esophagitis     ICD-10-CM: K21.9  ICD-9-CM: 530.81     Chronic constipation     ICD-10-CM: K59.09  ICD-9-CM: 564.00     Healthcare maintenance     ICD-10-CM: Z00.00  ICD-9-CM: V70.0     Malignant neoplasm of left breast in female, estrogen receptor positive, unspecified site of breast     ICD-10-CM: C50.912, Z17.0  ICD-9-CM: 174.9, V86.0     Anxiety with depression     ICD-10-CM: F41.8  ICD-9-CM: 300.4     Generalized osteoarthrosis, involving multiple sites     ICD-10-CM: M15.9  ICD-9-CM: 715.09     Fibromyalgia     ICD-10-CM: M79.7  ICD-9-CM: 729.1     Arthralgia of right temporomandibular joint     ICD-10-CM: M26.621  ICD-9-CM: 524.62     Allergic rhinitis, unspecified seasonality, unspecified trigger     ICD-10-CM: J30.9  ICD-9-CM: 477.9 Astelin nasal spray prescribed

## 2025-02-24 ENCOUNTER — CLINICAL SUPPORT (OUTPATIENT)
Dept: FAMILY MEDICINE CLINIC | Facility: CLINIC | Age: 50
End: 2025-02-24
Payer: COMMERCIAL

## 2025-02-24 DIAGNOSIS — G43.009 MIGRAINE WITHOUT AURA AND WITHOUT STATUS MIGRAINOSUS, NOT INTRACTABLE: Primary | ICD-10-CM

## 2025-02-24 PROCEDURE — 96372 THER/PROPH/DIAG INJ SC/IM: CPT | Performed by: GENERAL PRACTICE

## 2025-02-24 RX ORDER — METHYLPREDNISOLONE ACETATE 40 MG/ML
40 INJECTION, SUSPENSION INTRA-ARTICULAR; INTRALESIONAL; INTRAMUSCULAR; SOFT TISSUE ONCE
Status: COMPLETED | OUTPATIENT
Start: 2025-02-24 | End: 2025-02-24

## 2025-02-24 RX ORDER — KETOROLAC TROMETHAMINE 30 MG/ML
30 INJECTION, SOLUTION INTRAMUSCULAR; INTRAVENOUS ONCE
Status: COMPLETED | OUTPATIENT
Start: 2025-02-24 | End: 2025-02-24

## 2025-02-24 RX ADMIN — KETOROLAC TROMETHAMINE 30 MG: 30 INJECTION, SOLUTION INTRAMUSCULAR; INTRAVENOUS at 13:09

## 2025-02-24 RX ADMIN — METHYLPREDNISOLONE ACETATE 40 MG: 40 INJECTION, SUSPENSION INTRA-ARTICULAR; INTRALESIONAL; INTRAMUSCULAR; SOFT TISSUE at 13:10

## 2025-02-24 NOTE — PROGRESS NOTES
Injection  Injection performed in left ventrogluteal by Yesy Verdugo MA. Patient tolerated the procedure well without complications.  02/24/25   Yesy Verdugo MA        Injection  Injection performed in right ventrogluteal by Yesy Verdugo MA. Patient tolerated the procedure well without complications.  02/24/25   Yesy Verdugo MA

## 2025-02-27 ENCOUNTER — OFFICE VISIT (OUTPATIENT)
Dept: FAMILY MEDICINE CLINIC | Facility: CLINIC | Age: 50
End: 2025-02-27
Payer: COMMERCIAL

## 2025-02-27 ENCOUNTER — HOSPITAL ENCOUNTER (OUTPATIENT)
Dept: GENERAL RADIOLOGY | Facility: HOSPITAL | Age: 50
Discharge: HOME | End: 2025-02-27
Admitting: PHYSICIAN ASSISTANT
Payer: COMMERCIAL

## 2025-02-27 VITALS
TEMPERATURE: 97.7 F | WEIGHT: 257.4 LBS | OXYGEN SATURATION: 96 % | BODY MASS INDEX: 41.37 KG/M2 | HEART RATE: 96 BPM | DIASTOLIC BLOOD PRESSURE: 82 MMHG | SYSTOLIC BLOOD PRESSURE: 120 MMHG | HEIGHT: 66 IN

## 2025-02-27 DIAGNOSIS — M46.1 SACROILIITIS: ICD-10-CM

## 2025-02-27 DIAGNOSIS — M54.41 ACUTE RIGHT-SIDED LOW BACK PAIN WITH RIGHT-SIDED SCIATICA: ICD-10-CM

## 2025-02-27 DIAGNOSIS — M25.552 BILATERAL HIP PAIN: Primary | ICD-10-CM

## 2025-02-27 DIAGNOSIS — M25.552 BILATERAL HIP PAIN: ICD-10-CM

## 2025-02-27 DIAGNOSIS — M25.551 BILATERAL HIP PAIN: ICD-10-CM

## 2025-02-27 DIAGNOSIS — M25.551 BILATERAL HIP PAIN: Primary | ICD-10-CM

## 2025-02-27 PROCEDURE — 99213 OFFICE O/P EST LOW 20 MIN: CPT | Performed by: PHYSICIAN ASSISTANT

## 2025-02-27 PROCEDURE — 72114 X-RAY EXAM L-S SPINE BENDING: CPT

## 2025-02-27 PROCEDURE — 73521 X-RAY EXAM HIPS BI 2 VIEWS: CPT

## 2025-02-27 RX ORDER — METHYLPREDNISOLONE ACETATE 80 MG/ML
80 INJECTION, SUSPENSION INTRA-ARTICULAR; INTRALESIONAL; INTRAMUSCULAR; SOFT TISSUE ONCE
Status: COMPLETED | OUTPATIENT
Start: 2025-02-27 | End: 2025-02-27

## 2025-02-27 RX ORDER — KETOROLAC TROMETHAMINE 30 MG/ML
60 INJECTION, SOLUTION INTRAMUSCULAR; INTRAVENOUS ONCE
Status: COMPLETED | OUTPATIENT
Start: 2025-02-27 | End: 2025-02-27

## 2025-02-27 RX ORDER — KETOROLAC TROMETHAMINE 10 MG/1
10 TABLET, FILM COATED ORAL EVERY 6 HOURS PRN
Qty: 16 TABLET | Refills: 0 | Status: SHIPPED | OUTPATIENT
Start: 2025-02-27 | End: 2025-03-03

## 2025-02-27 RX ADMIN — KETOROLAC TROMETHAMINE 60 MG: 30 INJECTION, SOLUTION INTRAMUSCULAR; INTRAVENOUS at 11:07

## 2025-02-27 RX ADMIN — METHYLPREDNISOLONE ACETATE 80 MG: 80 INJECTION, SUSPENSION INTRA-ARTICULAR; INTRALESIONAL; INTRAMUSCULAR; SOFT TISSUE at 11:07

## 2025-02-27 NOTE — PROGRESS NOTES
Subjective        Chief Complaint  Hip Pain (Right hip pain) and Back Pain    Subjective      Briana Ugarte is a 49 y.o. female who presents today to [facility] for Hip Pain (Right hip pain) and Back Pain.     Hip Pain (Right hip pain) and Back Pain  She has a known history of chronic low back pain with previous discectomy in 2022.  Over the last 1 to 2 weeks, she has had increased low back and right hip pain.  This has gradually progressed over the last week.  Pain radiates into the right buttock and  posterior right leg.  She came in earlier this week and received a ketorolac 30 mg IM and methylprednisolone 40 mg IM injection.  She reports this helped for about a day or so, but the pain has since returned and is getting worse.  Is worse with ambulation across the parking lot and up the stairs to work. Denies any loss of control of bowels or bladder.  No saddle anesthesia.  No fever or chills.      Current Outpatient Medications:     anastrozole (ARIMIDEX) 1 MG tablet, Take 1 tablet by mouth Daily., Disp: , Rfl:     Atogepant (Qulipta) 60 MG tablet, Take 1 tablet by mouth Daily., Disp: 30 tablet, Rfl: 5    cyanocobalamin 1000 MCG/ML injection, ADMINISTER 1 ML IN THE MUSCLE EVERY 28 DAYS AS DIRECTED BY PRESCRIBER, Disp: 1 mL, Rfl: 5    cyclobenzaprine (FLEXERIL) 10 MG tablet, Take 1 tablet by mouth 3 (Three) Times a Day As Needed for Muscle Spasms., Disp: , Rfl:     oxyCODONE-acetaminophen (PERCOCET) 7.5-325 MG per tablet, Take 1 tablet by mouth Every 6 (Six) Hours As Needed for Moderate Pain ., Disp: 25 tablet, Rfl: 0    pantoprazole (Protonix) 40 MG EC tablet, Take 1 tablet by mouth 2 (Two) Times a Day Before Meals., Disp: 60 tablet, Rfl: 5    pregabalin (LYRICA) 200 MG capsule, 3 (Three) Times a Day., Disp: , Rfl:     QUEtiapine (SEROquel) 50 MG tablet, 1-2 tablet 2-3 hours before bedtime, Disp: 60 tablet, Rfl: 5    sertraline (ZOLOFT) 100 MG tablet, Take 1.5 tablets  "by mouth Daily., Disp: 45 tablet, Rfl: 05    ubrogepant (Ubrelvy) 100 MG tablet, Take 1 tablet by mouth 1 (One) Time As Needed (Headache)., Disp: 2 tablet, Rfl: 0    vitamin D (ERGOCALCIFEROL) 1.25 MG (61238 UT) capsule capsule, Take 1 capsule by mouth 1 (One) Time Per Week., Disp: 4 capsule, Rfl: 5    dicyclomine (BENTYL) 20 MG tablet, Take 1 tablet by mouth Every 6 (Six) Hours As Needed for Abdominal Cramping (choking symptoms)., Disp: 50 tablet, Rfl: 1    ibuprofen (ADVIL,MOTRIN) 600 MG tablet, Take 1 tablet by mouth 3 (Three) Times a Day As Needed for Mild Pain or Moderate Pain., Disp: 90 tablet, Rfl: 0    ketorolac (TORADOL) 10 MG tablet, Take 1 tablet by mouth Every 6 (Six) Hours As Needed for Moderate Pain or Mild Pain for up to 4 days., Disp: 16 tablet, Rfl: 0    Current Facility-Administered Medications:     ketorolac (TORADOL) injection 60 mg, 60 mg, Intramuscular, Once, Rebecca Pedroza PA    methylPREDNISolone acetate (DEPO-medrol) injection 80 mg, 80 mg, Intramuscular, Once, Rebecca Pedroza PA      No Known Allergies    Objective     Objective   Vital Signs:  /82   Pulse 96   Temp 97.7 °F (36.5 °C) (Oral)   Ht 167.6 cm (65.98\")   Wt 117 kg (257 lb 6.4 oz)   SpO2 96%   BMI 41.57 kg/m²   Estimated body mass index is 41.57 kg/m² as calculated from the following:    Height as of this encounter: 167.6 cm (65.98\").    Weight as of this encounter: 117 kg (257 lb 6.4 oz).    Past Medical History:   Diagnosis Date    Anxiety     Arthritis     Breast cancer 1/24/2018    Breast mass     Cancer 07/2018    LEFT BREAST    Cervical disc disorder     Cholelithiasis 2011    Removed    Chronic back pain     Fibromyalgia     Fibromyalgia, primary     GERD (gastroesophageal reflux disease)     Hypothyroidism     Joint pain     Kidney stone     Leukocytosis     Low back pain 2005    [facility]    Lumbosacral disc disease     Migraine     Morbid obesity     Neuropathy     Peripheral " edema     Thoracic disc disorder      Past Surgical History:   Procedure Laterality Date    BACK SURGERY  2022    BARIATRIC SURGERY      sleeve gastrectomy    BREAST AUGMENTATION  2022    right    BREAST AUGMENTATION  2022    right    BREAST AUGMENTATION  2022    BREAST AUGMENTATION  2022    BREAST AUGMENTATION  2022    BREAST BIOPSY  left    BREAST SURGERY      left mastectomy with reconstruction and right augmentation     SECTION      x 2    CHOLECYSTECTOMY  2011    COLONOSCOPY N/A 2022    Procedure: COLONOSCOPY;  Surgeon: Bishop Clement MD;  Location:  [facility];  Service: Gastroenterology;  Laterality: N/A;    ENDOSCOPY N/A 2022    Procedure: ESOPHAGOGASTRODUODENOSCOPY WITH ANESTHESIA;  Surgeon: Bishop Clement MD;  Location:  [facility];  Service: Gastroenterology;  Laterality: N/A;    ENDOSCOPY N/A 2024    Procedure: ESOPHAGOGASTRODUODENOSCOPY WITH BIOPSY;  Surgeon: Skye Saucedo MD;  Location:  [facility];  Service: Gastroenterology;  Laterality: N/A;    EPIDURAL BLOCK   Spinal Block     Spinal Block    HYSTERECTOMY      LUMBAR DISCECTOMY Right 2022    Procedure: LUMBAR DISCECTOMY L5-S1 RIGHT;  Surgeon: Tejinder Kauffman MD;  Location:  [facility];  Service: Neurosurgery;  Laterality: Right;    REDUCTION MAMMAPLASTY  2022    Right    TRIGGER POINT INJECTION  Neck and shoulders     WISDOM TOOTH EXTRACTION       Social History     Socioeconomic History    Marital status:     Number of children: 2   Tobacco Use    Smoking status: Never     Passive exposure: Never    Smokeless tobacco: Never    Tobacco comments:     Years ago I tried it.   Vaping Use    Vaping status: Never Used   Substance and Sexual Activity    Alcohol use: No    Drug use: No    Sexual activity: Not Currently     Partners: Male     Birth control/protection: Post-menopausal, Tubal ligation, Hysterectomy, Surgical     Comment: Tubal      Physical  Exam  Vitals and nursing note reviewed.   Constitutional:       General: She is not in acute distress.     Appearance: She is well-developed. She is not diaphoretic.   HENT:      Head: Normocephalic and atraumatic.   Eyes:      General: No scleral icterus.        Right eye: No discharge.         Left eye: No discharge.      Conjunctiva/sclera: Conjunctivae normal.   Cardiovascular:      Rate and Rhythm: Normal rate and regular rhythm.      Heart sounds: Normal heart sounds. No murmur heard.     No friction rub. No gallop.   Pulmonary:      Effort: Pulmonary effort is normal. No respiratory distress.      Breath sounds: Normal breath sounds. No wheezing or rales.   Chest:      Chest wall: No tenderness.   Musculoskeletal:         General: Tenderness (Tenderness at the posterior right hip and right SI joint) present. Normal range of motion.      Cervical back: Normal range of motion and neck supple.   Skin:     General: Skin is warm and dry.      Coloration: Skin is not pale.      Findings: No erythema or rash.   Neurological:      Mental Status: She is alert and oriented to person, place, and time.   Psychiatric:         Behavior: Behavior normal.        Result Review :  The following data was reviewed by: GISELLE Franklin on 02/27/2025:  Hemoglobin A1C   Date Value Ref Range Status   07/05/2022 5.60 4.80 - 5.60 % Final     TSH   Date Value Ref Range Status   11/07/2024 3.040 0.270 - 4.200 uIU/mL Final     HDL Cholesterol   Date Value Ref Range Status   11/07/2024 77 (H) 40 - 60 mg/dL Final     LDL Cholesterol    Date Value Ref Range Status   11/07/2024 99 0 - 100 mg/dL Final     Triglycerides   Date Value Ref Range Status   11/07/2024 76 0 - 150 mg/dL Final     Total Cholesterol   Date Value Ref Range Status   10/09/2017 170 0 - 200 mg/dL Final     Comment:     Cholesterol Reference Ranges  (U.S. Department of Health and Human Services ATP III  Classifications)  Desirable          <200 mg/dL  Borderline High     200-239 mg/dL  High Risk          >240 mg/dL  Triglyceride Reference Ranges  (U.S. Department of Health and Human Services ATP III  Classifications)  Normal           <150 mg/dL  Borderline High  150-199 mg/dL  High             200-499 mg/dL  Very High        >500 mg/dL  HDL Reference Ranges  (U.S. Department of Health and Human Services ATP III  Classifcations)  Low     <40 mg/dl (major risk factor for CHD)  High    >60 mg/dl ('negative' risk factor for CHD)  LDL Reference Ranges  (U.S. Department of Health and Human Services ATP III  Classifcations)  Optimal          <100 mg/dL  Near Optimal     100-129 mg/dL  Borderline High  130-159 mg/dL  High             160-189 mg/dL  Very High        >189 mg/dL             Assessment / Plan         Assessment   Diagnoses and all orders for this visit:    1. Bilateral hip pain (Primary)  2. Acute right-sided low back pain with right-sided sciatica  3. Sacroiliitis  -Will update imaging with an x-ray of the lumbar spine with extension and flexion as well as bilateral hip x-ray.  -Continue home pain regimen with oxycodone-acetaminophen.  -Hold home ibuprofen for now.  -She received a dose of IM methylprednisolone 80 mg and ketorolac 60 mg in the office today.  -Follow with ketorolac 10 mg every 6 hours as needed for 4 days.  -We discussed ice, heat, stretching.  She chronically takes Flexeril, continue.  -We discussed concerning signs and symptoms and to seek evaluation should any of these develop.  -Return to clinic if no improvement noted or if symptoms are worsening.   -     XR Hips Bilateral With or Without Pelvis 2 View; Future  -     XR Spine Lumbar Complete With Flex & Ext; Future  -     ketorolac (TORADOL) injection 60 mg  -     methylPREDNISolone acetate (DEPO-medrol) injection 80 mg  -     ketorolac (TORADOL) 10 MG tablet; Take 1 tablet by mouth Every 6 (Six) Hours As Needed for Moderate Pain or Mild Pain for up to 4 days.  Dispense: 16 tablet; Refill: 0       New  Medications Ordered This Visit   Medications    ketorolac (TORADOL) 10 MG tablet     Sig: Take 1 tablet by mouth Every 6 (Six) Hours As Needed for Moderate Pain or Mild Pain for up to 4 days.     Dispense:  16 tablet     Refill:  0     How many doses of IV/IM ketorolac has the patient received? 1    ketorolac (TORADOL) injection 60 mg    methylPREDNISolone acetate (DEPO-medrol) injection 80 mg     Follow Up   Return if symptoms worsen or fail to improve.    Patient was given instructions and counseling regarding her condition or for health maintenance advice. Please see specific information pulled into the AVS if appropriate.       This document has been electronically signed by GISELLE Franklin   February 27, 2025 11:03 EST    Dictated Utilizing Dragon Dictation: Part of this note may be an electronic transcription/translation of spoken language to printed text using the Dragon Dictation System.

## 2025-03-06 ENCOUNTER — HOSPITAL ENCOUNTER (OUTPATIENT)
Dept: CT IMAGING | Facility: HOSPITAL | Age: 50
Discharge: HOME | End: 2025-03-06
Payer: COMMERCIAL

## 2025-03-06 DIAGNOSIS — M54.41 ACUTE RIGHT-SIDED LOW BACK PAIN WITH RIGHT-SIDED SCIATICA: ICD-10-CM

## 2025-03-06 DIAGNOSIS — M25.551 RIGHT HIP PAIN: ICD-10-CM

## 2025-03-06 LAB — CREAT BLDA-MCNC: 0.8 MG/DL (ref 0.6–1.3)

## 2025-03-06 PROCEDURE — 73701 CT LOWER EXTREMITY W/DYE: CPT

## 2025-03-06 PROCEDURE — 25510000001 IOPAMIDOL 61 % SOLUTION: Performed by: PHYSICIAN ASSISTANT

## 2025-03-06 PROCEDURE — 72132 CT LUMBAR SPINE W/DYE: CPT

## 2025-03-06 PROCEDURE — 82565 ASSAY OF CREATININE: CPT

## 2025-03-06 RX ORDER — IOPAMIDOL 612 MG/ML
100 INJECTION, SOLUTION INTRAVASCULAR
Status: COMPLETED | OUTPATIENT
Start: 2025-03-06 | End: 2025-03-06

## 2025-03-06 RX ADMIN — IOPAMIDOL 100 ML: 612 INJECTION, SOLUTION INTRAVENOUS at 14:48

## 2025-03-07 DIAGNOSIS — F41.8 ANXIETY ASSOCIATED WITH DEPRESSION: ICD-10-CM

## 2025-03-07 DIAGNOSIS — R79.89 LOW VITAMIN B12 LEVEL: ICD-10-CM

## 2025-03-07 RX ORDER — LANOLIN ALCOHOL/MO/W.PET/CERES
CREAM (GRAM) TOPICAL
Qty: 30 TABLET | Refills: 5 | OUTPATIENT
Start: 2025-03-07

## 2025-03-07 RX ORDER — ESCITALOPRAM OXALATE 20 MG/1
40 TABLET ORAL NIGHTLY
Qty: 60 TABLET | Refills: 5 | OUTPATIENT
Start: 2025-03-07

## 2025-03-07 NOTE — PROGRESS NOTES
Spoke with pt about the following per Dr. Arboleda. [facility]    -- CT Lumbar spine with no acute findings. If she is still having pain, please see about getting her back in for a follow up with Dr. Arboleda.

## 2025-03-16 DIAGNOSIS — E06.3 HYPOTHYROIDISM DUE TO HASHIMOTO'S THYROIDITIS: ICD-10-CM

## 2025-03-17 RX ORDER — LEVOTHYROXINE SODIUM 75 UG/1
75 TABLET ORAL DAILY
Qty: 30 TABLET | Refills: 5 | OUTPATIENT
Start: 2025-03-17

## 2025-03-18 ENCOUNTER — LAB (OUTPATIENT)
Dept: FAMILY MEDICINE CLINIC | Facility: CLINIC | Age: 50
End: 2025-03-18
Payer: COMMERCIAL

## 2025-03-18 DIAGNOSIS — E66.813 CLASS 3 SEVERE OBESITY WITH SERIOUS COMORBIDITY AND BODY MASS INDEX (BMI) OF 40.0 TO 44.9 IN ADULT, UNSPECIFIED OBESITY TYPE: ICD-10-CM

## 2025-03-18 DIAGNOSIS — N95.1 MENOPAUSAL SYMPTOM: ICD-10-CM

## 2025-03-18 DIAGNOSIS — I10 ESSENTIAL HYPERTENSION: Primary | Chronic | ICD-10-CM

## 2025-03-18 DIAGNOSIS — M54.41 ACUTE RIGHT-SIDED LOW BACK PAIN WITH RIGHT-SIDED SCIATICA: ICD-10-CM

## 2025-03-18 DIAGNOSIS — I10 ESSENTIAL HYPERTENSION: Chronic | ICD-10-CM

## 2025-03-18 DIAGNOSIS — E06.3 HYPOTHYROIDISM DUE TO HASHIMOTO'S THYROIDITIS: ICD-10-CM

## 2025-03-18 DIAGNOSIS — F41.8 ANXIETY WITH DEPRESSION: Chronic | ICD-10-CM

## 2025-03-18 DIAGNOSIS — E66.01 CLASS 3 SEVERE OBESITY WITH SERIOUS COMORBIDITY AND BODY MASS INDEX (BMI) OF 40.0 TO 44.9 IN ADULT, UNSPECIFIED OBESITY TYPE: ICD-10-CM

## 2025-03-18 DIAGNOSIS — M25.551 RIGHT HIP PAIN: ICD-10-CM

## 2025-03-18 LAB
ALBUMIN SERPL-MCNC: 4.3 G/DL (ref 3.5–5.2)
ALBUMIN/GLOB SERPL: 1.4 G/DL
ALP SERPL-CCNC: 124 U/L (ref 39–117)
ALT SERPL W P-5'-P-CCNC: 17 U/L (ref 1–33)
ANION GAP SERPL CALCULATED.3IONS-SCNC: 7.8 MMOL/L (ref 5–15)
AST SERPL-CCNC: 19 U/L (ref 1–32)
BASOPHILS # BLD AUTO: 0.08 10*3/MM3 (ref 0–0.2)
BASOPHILS NFR BLD AUTO: 1 % (ref 0–1.5)
BILIRUB SERPL-MCNC: 0.3 MG/DL (ref 0–1.2)
BUN SERPL-MCNC: 12 MG/DL (ref 6–20)
BUN/CREAT SERPL: 14.5 (ref 7–25)
CALCIUM SPEC-SCNC: 9.3 MG/DL (ref 8.6–10.5)
CHLORIDE SERPL-SCNC: 104 MMOL/L (ref 98–107)
CO2 SERPL-SCNC: 29.2 MMOL/L (ref 22–29)
CORTIS AM PEAK SERPL-MCNC: 5.12 MCG/DL
CREAT SERPL-MCNC: 0.83 MG/DL (ref 0.57–1)
CRP SERPL-MCNC: 1.02 MG/DL (ref 0–0.5)
DEPRECATED RDW RBC AUTO: 44.3 FL (ref 37–54)
EGFRCR SERPLBLD CKD-EPI 2021: 87.6 ML/MIN/1.73
EOSINOPHIL # BLD AUTO: 0.25 10*3/MM3 (ref 0–0.4)
EOSINOPHIL NFR BLD AUTO: 3.2 % (ref 0.3–6.2)
ERYTHROCYTE [DISTWIDTH] IN BLOOD BY AUTOMATED COUNT: 15.4 % (ref 12.3–15.4)
ERYTHROCYTE [SEDIMENTATION RATE] IN BLOOD: 11 MM/HR (ref 0–20)
GLOBULIN UR ELPH-MCNC: 3 GM/DL
GLUCOSE SERPL-MCNC: 80 MG/DL (ref 65–99)
HCT VFR BLD AUTO: 40.1 % (ref 34–46.6)
HGB BLD-MCNC: 12.6 G/DL (ref 12–15.9)
IMM GRANULOCYTES # BLD AUTO: 0.03 10*3/MM3 (ref 0–0.05)
IMM GRANULOCYTES NFR BLD AUTO: 0.4 % (ref 0–0.5)
LYMPHOCYTES # BLD AUTO: 2.36 10*3/MM3 (ref 0.7–3.1)
LYMPHOCYTES NFR BLD AUTO: 30.3 % (ref 19.6–45.3)
MCH RBC QN AUTO: 25.3 PG (ref 26.6–33)
MCHC RBC AUTO-ENTMCNC: 31.4 G/DL (ref 31.5–35.7)
MCV RBC AUTO: 80.4 FL (ref 79–97)
MONOCYTES # BLD AUTO: 0.6 10*3/MM3 (ref 0.1–0.9)
MONOCYTES NFR BLD AUTO: 7.7 % (ref 5–12)
NEUTROPHILS NFR BLD AUTO: 4.46 10*3/MM3 (ref 1.7–7)
NEUTROPHILS NFR BLD AUTO: 57.4 % (ref 42.7–76)
NRBC BLD AUTO-RTO: 0 /100 WBC (ref 0–0.2)
PLATELET # BLD AUTO: 410 10*3/MM3 (ref 140–450)
PMV BLD AUTO: 10.2 FL (ref 6–12)
POTASSIUM SERPL-SCNC: 4 MMOL/L (ref 3.5–5.2)
PROT SERPL-MCNC: 7.3 G/DL (ref 6–8.5)
RBC # BLD AUTO: 4.99 10*6/MM3 (ref 3.77–5.28)
SODIUM SERPL-SCNC: 141 MMOL/L (ref 136–145)
URATE SERPL-MCNC: 5.5 MG/DL (ref 2.4–5.7)
WBC NRBC COR # BLD AUTO: 7.78 10*3/MM3 (ref 3.4–10.8)

## 2025-03-18 PROCEDURE — 82533 TOTAL CORTISOL: CPT | Performed by: PHYSICIAN ASSISTANT

## 2025-03-18 PROCEDURE — 85025 COMPLETE CBC W/AUTO DIFF WBC: CPT | Performed by: GENERAL PRACTICE

## 2025-03-18 PROCEDURE — 86140 C-REACTIVE PROTEIN: CPT | Performed by: PHYSICIAN ASSISTANT

## 2025-03-18 PROCEDURE — 85652 RBC SED RATE AUTOMATED: CPT | Performed by: PHYSICIAN ASSISTANT

## 2025-03-18 PROCEDURE — 80053 COMPREHEN METABOLIC PANEL: CPT | Performed by: PHYSICIAN ASSISTANT

## 2025-03-18 PROCEDURE — 84550 ASSAY OF BLOOD/URIC ACID: CPT | Performed by: PHYSICIAN ASSISTANT

## 2025-03-18 PROCEDURE — 36415 COLL VENOUS BLD VENIPUNCTURE: CPT

## 2025-03-18 RX ORDER — FLUOXETINE 20 MG/1
20 TABLET, FILM COATED ORAL DAILY
Qty: 30 TABLET | Refills: 5 | Status: SHIPPED | OUTPATIENT
Start: 2025-03-18

## 2025-03-26 RX ORDER — HYDROXYZINE PAMOATE 25 MG/1
25 CAPSULE ORAL 3 TIMES DAILY PRN
Qty: 90 CAPSULE | Refills: 0 | OUTPATIENT
Start: 2025-03-26

## 2025-04-09 DIAGNOSIS — N95.1 MENOPAUSAL SYMPTOM: ICD-10-CM

## 2025-04-09 DIAGNOSIS — F41.8 ANXIETY WITH DEPRESSION: Chronic | ICD-10-CM

## 2025-04-09 RX ORDER — FLUOXETINE 20 MG/1
40 TABLET, FILM COATED ORAL DAILY
Qty: 60 TABLET | Refills: 5 | Status: SHIPPED | OUTPATIENT
Start: 2025-04-09

## 2025-05-02 RX ORDER — CODEINE PHOSPHATE AND GUAIFENESIN 10; 100 MG/5ML; MG/5ML
10 SOLUTION ORAL 4 TIMES DAILY PRN
Qty: 237 ML | Refills: 0 | Status: SHIPPED | OUTPATIENT
Start: 2025-05-02

## 2025-05-05 ENCOUNTER — TELEMEDICINE (OUTPATIENT)
Dept: FAMILY MEDICINE CLINIC | Facility: TELEHEALTH | Age: 50
End: 2025-05-05
Payer: COMMERCIAL

## 2025-05-05 DIAGNOSIS — J22 LOWER RESPIRATORY INFECTION (E.G., BRONCHITIS, PNEUMONIA, PNEUMONITIS, PULMONITIS): Primary | ICD-10-CM

## 2025-05-05 RX ORDER — PREDNISONE 10 MG/1
TABLET ORAL DAILY
Qty: 21 EACH | Refills: 0 | Status: SHIPPED | OUTPATIENT
Start: 2025-05-05 | End: 2025-05-11

## 2025-05-05 RX ORDER — DOXYCYCLINE 100 MG/1
100 CAPSULE ORAL 2 TIMES DAILY
Qty: 14 CAPSULE | Refills: 0 | Status: SHIPPED | OUTPATIENT
Start: 2025-05-05 | End: 2025-05-12

## 2025-05-05 RX ORDER — ALBUTEROL SULFATE 90 UG/1
2 INHALANT RESPIRATORY (INHALATION) EVERY 4 HOURS PRN
Qty: 18 G | Refills: 0 | Status: SHIPPED | OUTPATIENT
Start: 2025-05-05

## 2025-05-05 NOTE — PATIENT INSTRUCTIONS
Continue treating symptoms, if no improvement follow-up in person for further workup.    If symptoms worsen or do not improve follow up with your PCP or visit your nearest Urgent Care Center or ER.

## 2025-05-05 NOTE — PROGRESS NOTES
Subjective   Chief Complaint   Patient presents with    URI       Briana Ugarte is a 49 y.o. female.     History of Present Illness  Patient reports having URI symptoms that started last week with progression to lower respiratory symptoms.  She is now experiencing pain with deep breathing, dry cough, postnasal drainage, mild wheezing and shortness of air due to inability to take a deep breath.  Her PCP called her and Tessalon Perles 3 days ago, she has been taking them with no significant relief in symptoms.  URI   This is a new problem. The current episode started in the past 7 days. The problem has been gradually worsening. There has been no fever. Associated symptoms include coughing and wheezing. Pertinent negatives include no chest pain, congestion, diarrhea, nausea, rhinorrhea, sinus pain, sore throat or vomiting. Treatments tried: tessalon. The treatment provided no relief.        No Known Allergies    Past Medical History:   Diagnosis Date    Anxiety     Arthritis     Breast cancer 2018    Breast mass     Cancer 2018    LEFT BREAST    Cervical disc disorder     Cholelithiasis     Removed    Chronic back pain     Fibromyalgia     Fibromyalgia, primary     GERD (gastroesophageal reflux disease)     Hypothyroidism     Joint pain     Kidney stone     Leukocytosis     Low back pain     [facility]    Lumbosacral disc disease     Migraine     Morbid obesity     Neuropathy     Peripheral edema     Thoracic disc disorder        Past Surgical History:   Procedure Laterality Date    BACK SURGERY  2022    BARIATRIC SURGERY      sleeve gastrectomy    BREAST AUGMENTATION  2022    right    BREAST AUGMENTATION  2022    right    BREAST AUGMENTATION  2022    BREAST AUGMENTATION  2022    BREAST AUGMENTATION  2022    BREAST BIOPSY  left    BREAST SURGERY      left mastectomy with reconstruction and right augmentation     SECTION      x 2    CHOLECYSTECTOMY   2011    COLONOSCOPY N/A 06/02/2022    Procedure: COLONOSCOPY;  Surgeon: Bishop Clement MD;  Location:  [facility];  Service: Gastroenterology;  Laterality: N/A;    ENDOSCOPY N/A 06/02/2022    Procedure: ESOPHAGOGASTRODUODENOSCOPY WITH ANESTHESIA;  Surgeon: Bishop Clement MD;  Location:  [facility];  Service: Gastroenterology;  Laterality: N/A;    ENDOSCOPY N/A 8/21/2024    Procedure: ESOPHAGOGASTRODUODENOSCOPY WITH BIOPSY;  Surgeon: Skye Saucedo MD;  Location:  [facility];  Service: Gastroenterology;  Laterality: N/A;    EPIDURAL BLOCK  1996 Spinal Block    2014 Spinal Block    HYSTERECTOMY      LUMBAR DISCECTOMY Right 07/06/2022    Procedure: LUMBAR DISCECTOMY L5-S1 RIGHT;  Surgeon: Tejinder Kauffman MD;  Location:  [facility];  Service: Neurosurgery;  Laterality: Right;    REDUCTION MAMMAPLASTY  Jan 2022    Right    TRIGGER POINT INJECTION  Neck and shoulders 2020    WISDOM TOOTH EXTRACTION  2013       Social History     Socioeconomic History    Marital status:     Number of children: 2   Tobacco Use    Smoking status: Never     Passive exposure: Never    Smokeless tobacco: Never    Tobacco comments:     Years ago I tried it.   Vaping Use    Vaping status: Never Used   Substance and Sexual Activity    Alcohol use: No    Drug use: No    Sexual activity: Not Currently     Partners: Male     Birth control/protection: Post-menopausal, Tubal ligation, Hysterectomy, Surgical     Comment: Tubal       Family History   Problem Relation Age of Onset    Hyperlipidemia Mother     Arthritis Mother     Hypertension Mother     NICK disease Mother     Hiatal hernia Mother     Hypertension Father     Neuropathy Father     Migraines Father     Arthritis Father     Heart disease Brother     Congenital heart disease Brother     Drug abuse Brother         Passed away 2005    Early death Brother     Cancer Maternal Aunt     Esophageal cancer Maternal Uncle     Cancer Paternal Aunt         lung, brain     Cancer Paternal Aunt     Congenital heart disease Maternal Grandmother     Cancer Paternal Grandmother     Stroke Paternal Grandfather     Cancer Paternal Grandfather     Colon cancer Paternal Grandfather     Anxiety disorder Daughter     Mental illness Daughter         Diag with Schizephrenia    Depression Daughter     Cancer Other     Cancer Other     Breast cancer Neg Hx          Current Outpatient Medications:     albuterol sulfate  (90 Base) MCG/ACT inhaler, Inhale 2 puffs Every 4 (Four) Hours As Needed for Wheezing or Shortness of Air., Disp: 18 g, Rfl: 0    anastrozole (ARIMIDEX) 1 MG tablet, Take 1 tablet by mouth Daily., Disp: , Rfl:     Atogepant (Qulipta) 60 MG tablet, Take 1 tablet by mouth Daily., Disp: 30 tablet, Rfl: 5    cyanocobalamin 1000 MCG/ML injection, ADMINISTER 1 ML IN THE MUSCLE EVERY 28 DAYS AS DIRECTED BY PRESCRIBER, Disp: 1 mL, Rfl: 5    cyclobenzaprine (FLEXERIL) 10 MG tablet, Take 1 tablet by mouth 3 (Three) Times a Day As Needed for Muscle Spasms., Disp: , Rfl:     dicyclomine (BENTYL) 20 MG tablet, Take 1 tablet by mouth Every 6 (Six) Hours As Needed for Abdominal Cramping (choking symptoms)., Disp: 50 tablet, Rfl: 1    doxycycline (VIBRAMYCIN) 100 MG capsule, Take 1 capsule by mouth 2 (Two) Times a Day for 7 days., Disp: 14 capsule, Rfl: 0    FLUoxetine (PROzac) 20 MG tablet, Take 2 tablets by mouth Daily., Disp: 60 tablet, Rfl: 5    guaiFENesin-codeine (ROMILAR-AC) 100-10 MG/5ML solution/syrup, Take 10 mL by mouth 4 (Four) Times a Day As Needed for Cough., Disp: 237 mL, Rfl: 0    ibuprofen (ADVIL,MOTRIN) 600 MG tablet, Take 1 tablet by mouth 3 (Three) Times a Day As Needed for Mild Pain or Moderate Pain., Disp: 90 tablet, Rfl: 0    oxyCODONE-acetaminophen (PERCOCET) 7.5-325 MG per tablet, Take 1 tablet by mouth Every 6 (Six) Hours As Needed for Moderate Pain ., Disp: 25 tablet, Rfl: 0    pantoprazole (Protonix) 40 MG EC tablet, Take 1 tablet by mouth 2 (Two) Times a Day  Before Meals., Disp: 60 tablet, Rfl: 5    predniSONE (DELTASONE) 10 MG (21) dose pack, Take  by mouth Daily for 6 days., Disp: 21 each, Rfl: 0    pregabalin (LYRICA) 200 MG capsule, 3 (Three) Times a Day., Disp: , Rfl:     QUEtiapine (SEROquel) 50 MG tablet, 1-2 tablet 2-3 hours before bedtime, Disp: 60 tablet, Rfl: 5    ubrogepant (Ubrelvy) 100 MG tablet, Take 1 tablet by mouth 1 (One) Time As Needed (Headache)., Disp: 2 tablet, Rfl: 0    vitamin D (ERGOCALCIFEROL) 1.25 MG (36324 UT) capsule capsule, Take 1 capsule by mouth 1 (One) Time Per Week., Disp: 4 capsule, Rfl: 5      Review of Systems   Constitutional:  Positive for fatigue. Negative for chills, diaphoresis and fever.   HENT:  Positive for postnasal drip. Negative for congestion, rhinorrhea, sinus pressure and sore throat.    Respiratory:  Positive for cough, shortness of breath and wheezing.    Cardiovascular:  Negative for chest pain and palpitations.   Gastrointestinal:  Negative for diarrhea, nausea and vomiting.   Musculoskeletal:  Positive for myalgias.   Neurological:  Negative for headache.        There were no vitals filed for this visit.    Objective   Physical Exam  Constitutional:       General: She is not in acute distress.     Appearance: Normal appearance. She is not ill-appearing, toxic-appearing or diaphoretic.   HENT:      Head: Normocephalic.      Mouth/Throat:      Lips: Pink.      Mouth: Mucous membranes are moist.   Pulmonary:      Effort: Pulmonary effort is normal.   Neurological:      Mental Status: She is alert and oriented to person, place, and time.          Procedures     Assessment & Plan   Diagnoses and all orders for this visit:    1. Lower respiratory infection (e.g., bronchitis, pneumonia, pneumonitis, pulmonitis) (Primary)  -     predniSONE (DELTASONE) 10 MG (21) dose pack; Take  by mouth Daily for 6 days.  Dispense: 21 each; Refill: 0  -     albuterol sulfate  (90 Base) MCG/ACT inhaler; Inhale 2 puffs Every 4  (Four) Hours As Needed for Wheezing or Shortness of Air.  Dispense: 18 g; Refill: 0  -     doxycycline (VIBRAMYCIN) 100 MG capsule; Take 1 capsule by mouth 2 (Two) Times a Day for 7 days.  Dispense: 14 capsule; Refill: 0            PLAN: Discussed dosing, side effects, recommended other symptomatic care.  Patient should follow up with primary care provider, Urgent Care or ER if symptoms worsen, fail to resolve or other symptoms need attention. Patient/family agree to the above.         LAKISHA Jose     Mode of Visit: Video  Location of patient: -HOME-  Location of provider: +HOME+  You have chosen to receive care through a telehealth visit.  The patient has signed the video visit consent form.  The visit included audio and video interaction. No technical issues occurred during this visit.    The use of a video visit has been reviewed with the patient and verbal informed consent has been obtained. Myself and Briana Ugarte participated in this visit. The patient is located at [address] [id number]. I am located in [place], [place]. Mychart and Zoom were utilized.        This visit was performed via Telehealth.  This patient has been instructed to follow-up with their primary care provider if their symptoms worsen or the treatment provided does not resolve their illness.

## 2025-05-08 DIAGNOSIS — E66.01 CLASS 3 SEVERE OBESITY WITH SERIOUS COMORBIDITY AND BODY MASS INDEX (BMI) OF 40.0 TO 44.9 IN ADULT, UNSPECIFIED OBESITY TYPE: Primary | ICD-10-CM

## 2025-05-08 DIAGNOSIS — E66.813 CLASS 3 SEVERE OBESITY WITH SERIOUS COMORBIDITY AND BODY MASS INDEX (BMI) OF 40.0 TO 44.9 IN ADULT, UNSPECIFIED OBESITY TYPE: Primary | ICD-10-CM

## 2025-05-08 RX ORDER — METFORMIN HYDROCHLORIDE 500 MG/1
TABLET, EXTENDED RELEASE ORAL
Qty: 60 TABLET | Refills: 2 | Status: SHIPPED | OUTPATIENT
Start: 2025-05-08

## 2025-05-28 ENCOUNTER — PRIOR AUTHORIZATION (OUTPATIENT)
Dept: FAMILY MEDICINE CLINIC | Facility: CLINIC | Age: 50
End: 2025-05-28
Payer: COMMERCIAL

## 2025-06-03 ENCOUNTER — OFFICE VISIT (OUTPATIENT)
Dept: FAMILY MEDICINE CLINIC | Facility: CLINIC | Age: 50
End: 2025-06-03
Payer: COMMERCIAL

## 2025-06-03 VITALS
DIASTOLIC BLOOD PRESSURE: 88 MMHG | SYSTOLIC BLOOD PRESSURE: 130 MMHG | BODY MASS INDEX: 40.98 KG/M2 | HEIGHT: 66 IN | WEIGHT: 255 LBS | HEART RATE: 67 BPM | TEMPERATURE: 101.1 F | OXYGEN SATURATION: 98 %

## 2025-06-03 DIAGNOSIS — J02.0 STREP PHARYNGITIS: Primary | ICD-10-CM

## 2025-06-03 DIAGNOSIS — K21.9 GASTROESOPHAGEAL REFLUX DISEASE WITHOUT ESOPHAGITIS: Chronic | ICD-10-CM

## 2025-06-03 DIAGNOSIS — J02.9 SORETHROAT: ICD-10-CM

## 2025-06-03 DIAGNOSIS — M79.7 FIBROMYALGIA: Chronic | ICD-10-CM

## 2025-06-03 LAB
EXPIRATION DATE: ABNORMAL
EXPIRATION DATE: NORMAL
FLUAV AG NPH QL: NEGATIVE
FLUBV AG NPH QL: NEGATIVE
INTERNAL CONTROL: ABNORMAL
INTERNAL CONTROL: NORMAL
Lab: ABNORMAL
Lab: NORMAL
S PYO AG THROAT QL: POSITIVE

## 2025-06-03 RX ORDER — FLUCONAZOLE 150 MG/1
150 TABLET ORAL DAILY
Qty: 3 TABLET | Refills: 0 | Status: SHIPPED | OUTPATIENT
Start: 2025-06-03

## 2025-06-03 NOTE — PROGRESS NOTES
"Chief Complaint -sore throat    History of Present Illness -     Briana Ugarte is a 50 y.o. female.     Sore throat-  She complains of sudden onset of sore throat, headache and fever 103 °F yesterday.  Some relief with alternating Tylenol and ibuprofen.    GERD-  Stable with pantoprazole and dietary modification    Fibromyalgia-  Stable with Lyrica and fluoxetine.  She denies any hallucinations, SI or HI      The following portions of the patient's history were reviewed and updated as appropriate: allergies, current medications, past family history, past medical history, past social history, past surgical history and problem list.        Objective  Vital signs:  /88 (BP Location: Right arm, Patient Position: Sitting, Cuff Size: Adult)   Pulse 67   Temp (!) 101.1 °F (38.4 °C) (Temporal)   Ht 167.6 cm (65.98\")   Wt 116 kg (255 lb)   SpO2 98%   BMI 41.18 kg/m²     Physical Exam  Vitals and nursing note reviewed.   Constitutional:       General: She is not in acute distress.     Appearance: Normal appearance. She is well-developed. She is not diaphoretic.   HENT:      Head: Normocephalic and atraumatic.      Right Ear: Tympanic membrane, ear canal and external ear normal.      Left Ear: Tympanic membrane, ear canal and external ear normal.      Nose: Nose normal.      Mouth/Throat:      Mouth: Mucous membranes are moist.      Pharynx: Oropharyngeal exudate and posterior oropharyngeal erythema present.      Comments: Enlarged exudative tonsils noted bilaterally  Neck:      Thyroid: No thyromegaly.   Cardiovascular:      Rate and Rhythm: Normal rate and regular rhythm.      Heart sounds: Normal heart sounds. No murmur heard.  Pulmonary:      Effort: Pulmonary effort is normal.      Breath sounds: Normal breath sounds. No wheezing or rales.   Musculoskeletal:      Cervical back: Normal range of motion and neck supple.   Lymphadenopathy:      Cervical: Cervical adenopathy present.   Skin:     General: " Skin is warm and dry.      Findings: No rash.   Neurological:      Mental Status: She is alert and oriented to person, place, and time.   Psychiatric:         Mood and Affect: Mood normal.         Behavior: Behavior normal.         Thought Content: Thought content normal.         The following data was reviewed by Raisa Escobar PA-C:         Lab Results   Component Value Date    BUN 12 03/18/2025    CREATININE 0.83 03/18/2025    EGFR 87.6 03/18/2025    ALT 17 03/18/2025    AST 19 03/18/2025    WBC 7.78 03/18/2025    HGB 12.6 03/18/2025    HCT 40.1 03/18/2025     03/18/2025    CHOL 190 11/07/2024    TRIG 76 11/07/2024    HDL 77 (H) 11/07/2024    LDL 99 11/07/2024    TSH 3.040 11/07/2024    HGBA1C 5.60 07/05/2022           Assessment & Plan     Diagnoses and all orders for this visit:    1. Strep pharyngitis (Primary)  -     amoxicillin-clavulanate (AUGMENTIN) 875-125 MG per tablet; Take 1 tablet by mouth 2 (Two) Times a Day.  Dispense: 20 tablet; Refill: 0    2. Sorethroat  -     POCT rapid strep A  -     POCT Influenza A/B    3. Gastroesophageal reflux disease without esophagitis  Comments:  Continue pantoprazole  Advised to avoid known trigger foods    4. Fibromyalgia  Comments:  Continue Lyrica and fluoxetine    Other orders  -     fluconazole (Diflucan) 150 MG tablet; Take 1 tablet by mouth Daily.  Dispense: 3 tablet; Refill: 0                   Patient was given instructions and counseling regarding his condition or for health maintenance advice. Please see specific information pulled into the AVS if appropriate      This document has been electronically signed by:  Raisa Escobar PA-C

## 2025-07-15 DIAGNOSIS — M15.9 GENERALIZED OSTEOARTHROSIS, INVOLVING MULTIPLE SITES: Primary | ICD-10-CM

## 2025-07-22 ENCOUNTER — OFFICE VISIT (OUTPATIENT)
Dept: FAMILY MEDICINE CLINIC | Facility: CLINIC | Age: 50
End: 2025-07-22
Payer: COMMERCIAL

## 2025-07-22 DIAGNOSIS — R79.89 LOW VITAMIN B12 LEVEL: ICD-10-CM

## 2025-07-22 DIAGNOSIS — Z17.0 MALIGNANT NEOPLASM OF LEFT BREAST IN FEMALE, ESTROGEN RECEPTOR POSITIVE, UNSPECIFIED SITE OF BREAST: ICD-10-CM

## 2025-07-22 DIAGNOSIS — E06.3 HYPOTHYROIDISM DUE TO HASHIMOTO'S THYROIDITIS: ICD-10-CM

## 2025-07-22 DIAGNOSIS — I10 ESSENTIAL HYPERTENSION: Primary | Chronic | ICD-10-CM

## 2025-07-22 DIAGNOSIS — K21.9 GASTROESOPHAGEAL REFLUX DISEASE WITHOUT ESOPHAGITIS: ICD-10-CM

## 2025-07-22 DIAGNOSIS — Z00.00 HEALTHCARE MAINTENANCE: ICD-10-CM

## 2025-07-22 DIAGNOSIS — C50.912 MALIGNANT NEOPLASM OF LEFT BREAST IN FEMALE, ESTROGEN RECEPTOR POSITIVE, UNSPECIFIED SITE OF BREAST: ICD-10-CM

## 2025-07-22 DIAGNOSIS — E66.813 CLASS 3 SEVERE OBESITY WITH SERIOUS COMORBIDITY AND BODY MASS INDEX (BMI) OF 40.0 TO 44.9 IN ADULT: ICD-10-CM

## 2025-07-22 DIAGNOSIS — K76.0 NAFLD (NONALCOHOLIC FATTY LIVER DISEASE): ICD-10-CM

## 2025-07-22 DIAGNOSIS — G43.009 MIGRAINE WITHOUT AURA AND WITHOUT STATUS MIGRAINOSUS, NOT INTRACTABLE: ICD-10-CM

## 2025-07-22 DIAGNOSIS — M79.7 FIBROMYALGIA: ICD-10-CM

## 2025-07-22 DIAGNOSIS — K59.09 CHRONIC CONSTIPATION: ICD-10-CM

## 2025-07-22 DIAGNOSIS — E55.9 VITAMIN D DEFICIENCY: Chronic | ICD-10-CM

## 2025-07-22 DIAGNOSIS — M15.9 GENERALIZED OSTEOARTHROSIS, INVOLVING MULTIPLE SITES: ICD-10-CM

## 2025-07-22 DIAGNOSIS — N95.1 MENOPAUSAL SYMPTOM: ICD-10-CM

## 2025-07-22 DIAGNOSIS — F41.8 ANXIETY WITH DEPRESSION: Chronic | ICD-10-CM

## 2025-07-22 RX ORDER — TOPIRAMATE 25 MG/1
TABLET, FILM COATED ORAL
Qty: 42 TABLET | Refills: 0 | Status: SHIPPED | OUTPATIENT
Start: 2025-07-22

## 2025-07-22 RX ORDER — TOPIRAMATE 25 MG/1
TABLET, FILM COATED ORAL
Qty: 135 TABLET | OUTPATIENT
Start: 2025-07-22

## 2025-07-22 NOTE — PROGRESS NOTES
Subjective   Briana Ugarte is a 50 y.o. female.     Chief Complaint  She returns for a scheduled reassessment of multiple medical problems including migraine headaches, chronic pain, chronic anxiety with depression, hypothyroidism, gastroesophageal reflux disease, and previous breast cancer    History of Present Illness     Migraine Headaches  She has a long history of migraine.  She remains on botox injections through pain management.  She found qulipta helpful but has been unable to continue it due to cost considerations.  She has found ubrelvy more effective than anything else for acute relief.  She has noted some increase in the frequency of her migraines but denies any change in their quality.  Most headaches are also associated with photophobia and presbyphonia. She had 2 episodes in which her right upper lip went numb last month.  She denies any numbness elsewhere, and has had no weakness.  She denies any changes in her vision and has had no difficulty talking or understanding what is said to her.   She continues to deny any changes in her vision, strength, or sensation.      Chronic Low Back Pain  She underwent a right L5-S1 microlumbar discectomy by Dr. Kauffman on 7/6/2022.  This was uncomplicated, and while she continues to have low back pain, there is no history of any further leg pain.  She continues to deny any changes in her bowel/bladder control.  She continues to be followed by pain management and is currently maintained on oxycodone codon and pregabalin.  She has tapered the latter to 200 mg once daily due to concerns regarding his potential impact on her weight    Fibromyalgia  She gives a long history of generalized muscle pain, difficulty sleeping, and fatigue.  There is no history of any joint stiffness, swelling, or redness, and she continues to deny any rash.    Chronic Anxiety with Depression  She gives a long history of intermittent nervousness, worrying, and difficulty sleeping.   When severe her symptoms have been associated with depression.  She is currently on sertraline and clomipramine.  She continues to deny any apparent side effects, and there is no history of any loss of interest in activities or suicidal ideation    Hypothyroidism  She remains on levothyroxine 75 daily.  She admits to fatigue.      Gastroesophageal Reflux Disease  She reports a continued improvement in her heartburn and dysphagia to solids and liquids.  She continues to deny any divya vomiting or hematemesis, and there is no history of any abdominal pain, change in bowel habits, hematochezia, or melena.  She is currently on pantoprazole 40 daily, and continues to deny any NSAID use. She underwent an EGD by Dr. Clement on 6/2/2022 and was noted to have a small hiatal hernia, along with evidence of previous sleeve gastrectomy.  She underwent a reassessment with him on 3/20/2024 and an esophagram revealed a small sliding hiatal hernia, gastroesophageal reflux to the level of the mid esophagus, and occasional tertiary contractions.  She underwent an updated EGD by Dr. Saucedo on 8/21/2024 with evidence of a previous sleeve gastrectomy and antral gastritis.  Biopsies were consistent with mild chronic gastritis.  No H. pylori organisms were noted.  She was to undergo a reassessment with Morena Roberson PA-C on 11/21/2024 but missed this and has yet to reschedule    History of Breast Cancer  She is approximately 5 years post left total mastectomy with a level 1 and 2 axillary lymph node dissection following a positive sentinel node.  3 of 14 lymph nodes were positive.  She was eventually staged as 1A mpT2 pN1a Mo ER+ AZ+ HER2 +.  She was treated with adjuvant chemotherapy and radiation therapy.  She was recently changed from tamoxifen to anastrozole.  She denies any side effects thus far.  She continues to be followed by [facility], and underwent a reassessment with Dr. Benson on 6/18/2025 she was recommended a 1  year follow-up    Previous TLH/BSO  She underwent a TLH/BSO by Dr. Cardona at  on 7/17/2023.  This was uncomplicated and pathology was reported as showing adenomyosis and leiomyomas up to 1 cm in diameter.  She has noted a continued improvement in the frequency and severity of her hot flashes with the change in tamoxifen to anastrozole    The following portions of the patient's history were reviewed and updated as appropriate: allergies, current medications, past medical history, past social history, and problem list.    Review of Systems   Constitutional:  Positive for fatigue. Negative for chills and fever.   HENT:  Negative for congestion, ear pain, hearing loss, rhinorrhea, sore throat and tinnitus.    Eyes:  Negative for visual disturbance.   Respiratory:  Negative for cough, shortness of breath and wheezing.    Cardiovascular:  Negative for chest pain, palpitations and leg swelling.   Gastrointestinal:  Positive for GERD. Negative for abdominal pain, blood in stool, constipation, diarrhea, nausea and vomiting.        Occasional dysphagia   Endocrine:        Hot flashes   Genitourinary:  Negative for dysuria and hematuria.   Musculoskeletal:  Positive for arthralgias, back pain, gait problem, myalgias and neck pain. Negative for joint swelling.   Skin:  Negative for rash.   Neurological:  Positive for dizziness, weakness (generalized), numbness and headache.   Hematological:  Positive for adenopathy.   Psychiatric/Behavioral:  Positive for sleep disturbance. Negative for suicidal ideas and depressed mood. The patient is nervous/anxious.      Objective   Physical Exam  Constitutional:       General: She is not in acute distress.     Appearance: Normal appearance. She is well-developed. She is not diaphoretic.      Comments: Bright and in fair spirits. No apparent distress. No pallor, jaundice, diaphoresis, or cyanosis   HENT:      Head: Atraumatic.      Right Ear: Tympanic membrane, ear canal and external ear  normal.      Left Ear: Tympanic membrane, ear canal and external ear normal.      Mouth/Throat:      Lips: No lesions.      Mouth: Mucous membranes are moist. No oral lesions.      Pharynx: No oropharyngeal exudate or posterior oropharyngeal erythema.   Eyes:      General: Lids are normal.      Extraocular Movements: Extraocular movements intact.      Conjunctiva/sclera: Conjunctivae normal.      Pupils: Pupils are equal.   Neck:      Thyroid: No thyroid mass or thyromegaly.      Vascular: No carotid bruit or JVD.      Trachea: Trachea normal. No tracheal deviation.   Cardiovascular:      Rate and Rhythm: Normal rate and regular rhythm.      Heart sounds: Normal heart sounds, S1 normal and S2 normal. No murmur heard.     No gallop.   Pulmonary:      Effort: Pulmonary effort is normal.      Breath sounds: Normal breath sounds. No decreased breath sounds, wheezing, rhonchi or rales.   Abdominal:      General: Bowel sounds are normal. There is no distension.   Musculoskeletal:      Right lower leg: No edema.      Left lower leg: No edema.   Lymphadenopathy:      Head:      Right side of head: No submental, submandibular, tonsillar, preauricular, posterior auricular or occipital adenopathy.      Left side of head: No submental, submandibular, tonsillar, preauricular, posterior auricular or occipital adenopathy.      Cervical: No cervical adenopathy.      Upper Body:      Right upper body: No supraclavicular adenopathy.      Left upper body: No supraclavicular adenopathy.   Skin:     General: Skin is warm.      Coloration: Skin is not cyanotic, jaundiced or pale.      Findings: No rash.      Nails: There is no clubbing.   Neurological:      Mental Status: She is alert and oriented to person, place, and time.      Cranial Nerves: No cranial nerve deficit, dysarthria or facial asymmetry.      Sensory: No sensory deficit.      Motor: No tremor.      Coordination: Coordination normal.      Gait: Gait normal.   Psychiatric:          Attention and Perception: Attention normal.         Mood and Affect: Mood normal.         Speech: Speech normal.         Behavior: Behavior normal.         Thought Content: Thought content normal.           Assessment & Plan   Problems Addressed this Visit          Cardiac and Vasculature    Essential hypertension   Hypertension: BP remains at goal off medication. Evidence of target organ damage: none.  Encouraged to continue to work on diet and exercise plan.        Endocrine and Metabolic    Vitamin D deficiency (Chronic)    Class 3 severe obesity with serious comorbidity and body mass index (BMI) of 40.0 to 44.9 in adult    Hypothyroidism due to Hashimoto's thyroiditis  Clinically euthyroid.  Continue current medication.  Scheduled for updated labs    Low vitamin B12 level  Continue supplementation with monitoring.       Gastrointestinal Abdominal     Chronic constipation    Gastroesophageal reflux disease without esophagitis    NAFLD (nonalcoholic fatty liver disease)  As above.  Will continue to monitor       Genitourinary and Reproductive     Menopausal symptom       Health Encounters    Healthcare maintenance  Reminded to get a flu shot when available.       Hematology and Neoplasia    Malignant neoplasm of left breast in female, estrogen receptor positive  Continue current medication  Follow up with oncology       Mental Health    Anxiety with depression (Chronic)  Stable.  Supportive therapy.   Will consider replacing quetiapine with amitriptyline as the latter may have less impact on her weight and help some with her migraines       Musculoskeletal and Injuries    Fibromyalgia  Reminded regarding symptomatic treatment.   Continue current medication    Generalized osteoarthrosis, involving multiple sites  As above.  Follow up with pain management       Neuro    Migraine without aura and without status migrainosus, not intractable  With several episodes of right upper lip numbness last  month  Recommended immediate assessment if any further numbness or any other neurologic deficit  Agreed on the reintroduction of topiramate 25 every afternoon titrating to 100 twice daily over the next 6 weeks    Relevant Medications    ubrogepant (Ubrelvy) 100 MG tablet    topiramate (TOPAMAX) 25 MG tablet     Diagnoses         Codes Comments      Essential hypertension    -  Primary ICD-10-CM: I10  ICD-9-CM: 401.9       Vitamin D deficiency     ICD-10-CM: E55.9  ICD-9-CM: 268.9       Low vitamin B12 level     ICD-10-CM: R79.89  ICD-9-CM: 790.6       Hypothyroidism due to Hashimoto's thyroiditis     ICD-10-CM: E06.3  ICD-9-CM: 245.2       Class 3 severe obesity with serious comorbidity and body mass index (BMI) of 40.0 to 44.9 in adult     ICD-10-CM: E66.813, Z68.41  ICD-9-CM: 278.01, V85.41       NAFLD (nonalcoholic fatty liver disease)     ICD-10-CM: K76.0  ICD-9-CM: 571.8       Gastroesophageal reflux disease without esophagitis     ICD-10-CM: K21.9  ICD-9-CM: 530.81       Chronic constipation     ICD-10-CM: K59.09  ICD-9-CM: 564.00       Menopausal symptom     ICD-10-CM: N95.1  ICD-9-CM: 627.2       Healthcare maintenance     ICD-10-CM: Z00.00  ICD-9-CM: V70.0       Malignant neoplasm of left breast in female, estrogen receptor positive, unspecified site of breast     ICD-10-CM: C50.912, Z17.0  ICD-9-CM: 174.9, V86.0       Anxiety with depression     ICD-10-CM: F41.8  ICD-9-CM: 300.4       Generalized osteoarthrosis, involving multiple sites     ICD-10-CM: M15.9  ICD-9-CM: 715.09       Fibromyalgia     ICD-10-CM: M79.7  ICD-9-CM: 729.1       Migraine without aura and without status migrainosus, not intractable     ICD-10-CM: G43.009  ICD-9-CM: 346.10

## 2025-07-23 VITALS
DIASTOLIC BLOOD PRESSURE: 65 MMHG | TEMPERATURE: 98.6 F | WEIGHT: 262 LBS | HEIGHT: 66 IN | SYSTOLIC BLOOD PRESSURE: 125 MMHG | OXYGEN SATURATION: 97 % | RESPIRATION RATE: 14 BRPM | HEART RATE: 99 BPM | BODY MASS INDEX: 42.11 KG/M2

## 2025-07-23 DIAGNOSIS — E66.813 CLASS 3 SEVERE OBESITY WITH SERIOUS COMORBIDITY AND BODY MASS INDEX (BMI) OF 40.0 TO 44.9 IN ADULT, UNSPECIFIED OBESITY TYPE: ICD-10-CM

## 2025-07-23 RX ORDER — METFORMIN HYDROCHLORIDE 500 MG/1
1000 TABLET, EXTENDED RELEASE ORAL
Qty: 180 TABLET | Refills: 3 | Status: SHIPPED | OUTPATIENT
Start: 2025-07-23

## 2025-07-25 ENCOUNTER — LAB (OUTPATIENT)
Dept: FAMILY MEDICINE CLINIC | Facility: CLINIC | Age: 50
End: 2025-07-25
Payer: COMMERCIAL

## 2025-07-25 DIAGNOSIS — G43.009 MIGRAINE WITHOUT AURA AND WITHOUT STATUS MIGRAINOSUS, NOT INTRACTABLE: ICD-10-CM

## 2025-07-25 DIAGNOSIS — C50.912 MALIGNANT NEOPLASM OF LEFT BREAST IN FEMALE, ESTROGEN RECEPTOR POSITIVE, UNSPECIFIED SITE OF BREAST: ICD-10-CM

## 2025-07-25 DIAGNOSIS — E06.3 HYPOTHYROIDISM DUE TO HASHIMOTO'S THYROIDITIS: ICD-10-CM

## 2025-07-25 DIAGNOSIS — I10 ESSENTIAL HYPERTENSION: ICD-10-CM

## 2025-07-25 DIAGNOSIS — E66.813 CLASS 3 SEVERE OBESITY WITH SERIOUS COMORBIDITY AND BODY MASS INDEX (BMI) OF 40.0 TO 44.9 IN ADULT: ICD-10-CM

## 2025-07-25 DIAGNOSIS — Z00.00 HEALTHCARE MAINTENANCE: ICD-10-CM

## 2025-07-25 DIAGNOSIS — K59.09 CHRONIC CONSTIPATION: ICD-10-CM

## 2025-07-25 DIAGNOSIS — Z17.0 MALIGNANT NEOPLASM OF LEFT BREAST IN FEMALE, ESTROGEN RECEPTOR POSITIVE, UNSPECIFIED SITE OF BREAST: ICD-10-CM

## 2025-07-25 DIAGNOSIS — K76.0 NAFLD (NONALCOHOLIC FATTY LIVER DISEASE): ICD-10-CM

## 2025-07-25 DIAGNOSIS — R79.89 LOW VITAMIN B12 LEVEL: ICD-10-CM

## 2025-07-25 DIAGNOSIS — E55.9 VITAMIN D DEFICIENCY: Chronic | ICD-10-CM

## 2025-07-25 DIAGNOSIS — K21.9 GASTROESOPHAGEAL REFLUX DISEASE WITHOUT ESOPHAGITIS: ICD-10-CM

## 2025-07-25 LAB
25(OH)D3 SERPL-MCNC: 19.4 NG/ML (ref 30–100)
ALBUMIN SERPL-MCNC: 3.9 G/DL (ref 3.5–5.2)
ALBUMIN/GLOB SERPL: 1.3 G/DL
ALP SERPL-CCNC: 106 U/L (ref 39–117)
ALT SERPL W P-5'-P-CCNC: 19 U/L (ref 1–33)
ANION GAP SERPL CALCULATED.3IONS-SCNC: 11.3 MMOL/L (ref 5–15)
AST SERPL-CCNC: 20 U/L (ref 1–32)
BASOPHILS # BLD AUTO: 0.06 10*3/MM3 (ref 0–0.2)
BASOPHILS NFR BLD AUTO: 0.9 % (ref 0–1.5)
BILIRUB SERPL-MCNC: 0.4 MG/DL (ref 0–1.2)
BUN SERPL-MCNC: 11.5 MG/DL (ref 6–20)
BUN/CREAT SERPL: 13.1 (ref 7–25)
CALCIUM SPEC-SCNC: 9.1 MG/DL (ref 8.6–10.5)
CHLORIDE SERPL-SCNC: 104 MMOL/L (ref 98–107)
CHOLEST SERPL-MCNC: 180 MG/DL (ref 0–200)
CO2 SERPL-SCNC: 25.7 MMOL/L (ref 22–29)
CREAT SERPL-MCNC: 0.88 MG/DL (ref 0.57–1)
DEPRECATED RDW RBC AUTO: 47.2 FL (ref 37–54)
EGFRCR SERPLBLD CKD-EPI 2021: 81.2 ML/MIN/1.73
EOSINOPHIL # BLD AUTO: 0.22 10*3/MM3 (ref 0–0.4)
EOSINOPHIL NFR BLD AUTO: 3.4 % (ref 0.3–6.2)
ERYTHROCYTE [DISTWIDTH] IN BLOOD BY AUTOMATED COUNT: 15.6 % (ref 12.3–15.4)
GLOBULIN UR ELPH-MCNC: 3.1 GM/DL
GLUCOSE SERPL-MCNC: 98 MG/DL (ref 65–99)
HBA1C MFR BLD: 6.05 % (ref 4.8–5.6)
HCT VFR BLD AUTO: 39 % (ref 34–46.6)
HDLC SERPL-MCNC: 67 MG/DL (ref 40–60)
HGB BLD-MCNC: 12.1 G/DL (ref 12–15.9)
IMM GRANULOCYTES # BLD AUTO: 0.03 10*3/MM3 (ref 0–0.05)
IMM GRANULOCYTES NFR BLD AUTO: 0.5 % (ref 0–0.5)
LDLC SERPL CALC-MCNC: 101 MG/DL (ref 0–100)
LDLC/HDLC SERPL: 1.5 {RATIO}
LYMPHOCYTES # BLD AUTO: 1.78 10*3/MM3 (ref 0.7–3.1)
LYMPHOCYTES NFR BLD AUTO: 27.3 % (ref 19.6–45.3)
MCH RBC QN AUTO: 25.5 PG (ref 26.6–33)
MCHC RBC AUTO-ENTMCNC: 31 G/DL (ref 31.5–35.7)
MCV RBC AUTO: 82.3 FL (ref 79–97)
MONOCYTES # BLD AUTO: 0.41 10*3/MM3 (ref 0.1–0.9)
MONOCYTES NFR BLD AUTO: 6.3 % (ref 5–12)
NEUTROPHILS NFR BLD AUTO: 4.02 10*3/MM3 (ref 1.7–7)
NEUTROPHILS NFR BLD AUTO: 61.6 % (ref 42.7–76)
NRBC BLD AUTO-RTO: 0 /100 WBC (ref 0–0.2)
PLATELET # BLD AUTO: 391 10*3/MM3 (ref 140–450)
PMV BLD AUTO: 10.2 FL (ref 6–12)
POTASSIUM SERPL-SCNC: 4.5 MMOL/L (ref 3.5–5.2)
PROT SERPL-MCNC: 7 G/DL (ref 6–8.5)
RBC # BLD AUTO: 4.74 10*6/MM3 (ref 3.77–5.28)
SODIUM SERPL-SCNC: 141 MMOL/L (ref 136–145)
TRIGL SERPL-MCNC: 62 MG/DL (ref 0–150)
TSH SERPL DL<=0.05 MIU/L-ACNC: 4.14 UIU/ML (ref 0.27–4.2)
VIT B12 BLD-MCNC: 507 PG/ML (ref 211–946)
VLDLC SERPL-MCNC: 12 MG/DL (ref 5–40)
WBC NRBC COR # BLD AUTO: 6.52 10*3/MM3 (ref 3.4–10.8)

## 2025-07-25 PROCEDURE — 82607 VITAMIN B-12: CPT | Performed by: GENERAL PRACTICE

## 2025-07-25 PROCEDURE — 36415 COLL VENOUS BLD VENIPUNCTURE: CPT

## 2025-07-25 PROCEDURE — 82306 VITAMIN D 25 HYDROXY: CPT | Performed by: GENERAL PRACTICE

## 2025-07-25 PROCEDURE — 80061 LIPID PANEL: CPT | Performed by: GENERAL PRACTICE

## 2025-07-25 PROCEDURE — 80050 GENERAL HEALTH PANEL: CPT | Performed by: GENERAL PRACTICE

## 2025-07-25 PROCEDURE — 83036 HEMOGLOBIN GLYCOSYLATED A1C: CPT | Performed by: GENERAL PRACTICE

## 2025-08-05 DIAGNOSIS — K76.0 NAFLD (NONALCOHOLIC FATTY LIVER DISEASE): ICD-10-CM

## 2025-08-05 DIAGNOSIS — E66.813 CLASS 3 SEVERE OBESITY WITH SERIOUS COMORBIDITY AND BODY MASS INDEX (BMI) OF 40.0 TO 44.9 IN ADULT: Primary | ICD-10-CM

## 2025-08-05 RX ORDER — TIRZEPATIDE 2.5 MG/.5ML
2.5 INJECTION, SOLUTION SUBCUTANEOUS WEEKLY
Qty: 2 ML | Refills: 0 | Status: SHIPPED | OUTPATIENT
Start: 2025-08-05 | End: 2025-08-07

## 2025-08-07 ENCOUNTER — OFFICE VISIT (OUTPATIENT)
Dept: FAMILY MEDICINE CLINIC | Facility: CLINIC | Age: 50
End: 2025-08-07
Payer: COMMERCIAL

## 2025-08-07 VITALS
DIASTOLIC BLOOD PRESSURE: 86 MMHG | OXYGEN SATURATION: 97 % | TEMPERATURE: 98.7 F | BODY MASS INDEX: 41.46 KG/M2 | WEIGHT: 258 LBS | HEART RATE: 71 BPM | HEIGHT: 66 IN | RESPIRATION RATE: 14 BRPM | SYSTOLIC BLOOD PRESSURE: 128 MMHG

## 2025-08-07 DIAGNOSIS — E06.3 HYPOTHYROIDISM DUE TO HASHIMOTO'S THYROIDITIS: ICD-10-CM

## 2025-08-07 DIAGNOSIS — I10 ESSENTIAL HYPERTENSION: Primary | Chronic | ICD-10-CM

## 2025-08-07 DIAGNOSIS — R73.03 PREDIABETES: ICD-10-CM

## 2025-08-07 DIAGNOSIS — M15.9 GENERALIZED OSTEOARTHROSIS, INVOLVING MULTIPLE SITES: ICD-10-CM

## 2025-08-07 DIAGNOSIS — M79.7 FIBROMYALGIA: ICD-10-CM

## 2025-08-07 DIAGNOSIS — Z00.00 HEALTHCARE MAINTENANCE: ICD-10-CM

## 2025-08-07 DIAGNOSIS — Z17.0 MALIGNANT NEOPLASM OF LEFT BREAST IN FEMALE, ESTROGEN RECEPTOR POSITIVE, UNSPECIFIED SITE OF BREAST: ICD-10-CM

## 2025-08-07 DIAGNOSIS — F41.8 ANXIETY WITH DEPRESSION: Chronic | ICD-10-CM

## 2025-08-07 DIAGNOSIS — K59.09 CHRONIC CONSTIPATION: ICD-10-CM

## 2025-08-07 DIAGNOSIS — C50.912 MALIGNANT NEOPLASM OF LEFT BREAST IN FEMALE, ESTROGEN RECEPTOR POSITIVE, UNSPECIFIED SITE OF BREAST: ICD-10-CM

## 2025-08-07 DIAGNOSIS — M51.360 DEGENERATION OF INTERVERTEBRAL DISC OF LUMBAR REGION WITH DISCOGENIC BACK PAIN: ICD-10-CM

## 2025-08-07 DIAGNOSIS — K21.9 GASTROESOPHAGEAL REFLUX DISEASE WITHOUT ESOPHAGITIS: ICD-10-CM

## 2025-08-07 DIAGNOSIS — E66.813 CLASS 3 SEVERE OBESITY WITH SERIOUS COMORBIDITY AND BODY MASS INDEX (BMI) OF 40.0 TO 44.9 IN ADULT: ICD-10-CM

## 2025-08-07 DIAGNOSIS — G43.009 MIGRAINE WITHOUT AURA AND WITHOUT STATUS MIGRAINOSUS, NOT INTRACTABLE: ICD-10-CM

## 2025-08-07 DIAGNOSIS — K76.0 NAFLD (NONALCOHOLIC FATTY LIVER DISEASE): ICD-10-CM

## 2025-08-07 RX ORDER — TIRZEPATIDE 15 MG/.5ML
15 INJECTION, SOLUTION SUBCUTANEOUS WEEKLY
Qty: 2 ML | Refills: 5 | Status: SHIPPED | OUTPATIENT
Start: 2025-08-07

## 2025-08-20 ENCOUNTER — TELEMEDICINE (OUTPATIENT)
Dept: FAMILY MEDICINE CLINIC | Facility: TELEHEALTH | Age: 50
End: 2025-08-20
Payer: COMMERCIAL

## 2025-08-20 DIAGNOSIS — R39.89 SUSPECTED UTI: Primary | ICD-10-CM

## 2025-08-20 DIAGNOSIS — G43.009 MIGRAINE WITHOUT AURA AND WITHOUT STATUS MIGRAINOSUS, NOT INTRACTABLE: ICD-10-CM

## 2025-08-20 PROCEDURE — 99213 OFFICE O/P EST LOW 20 MIN: CPT | Performed by: NURSE PRACTITIONER

## 2025-08-20 RX ORDER — PHENAZOPYRIDINE HYDROCHLORIDE 200 MG/1
200 TABLET, FILM COATED ORAL 3 TIMES DAILY PRN
Qty: 6 TABLET | Refills: 0 | Status: SHIPPED | OUTPATIENT
Start: 2025-08-20 | End: 2025-08-22

## 2025-08-20 RX ORDER — SULFAMETHOXAZOLE AND TRIMETHOPRIM 800; 160 MG/1; MG/1
1 TABLET ORAL 2 TIMES DAILY
Qty: 14 TABLET | Refills: 0 | Status: SHIPPED | OUTPATIENT
Start: 2025-08-20 | End: 2025-08-27

## 2025-08-21 RX ORDER — PROMETHAZINE HYDROCHLORIDE 25 MG/1
25 TABLET ORAL EVERY 6 HOURS PRN
Qty: 30 TABLET | Refills: 0 | Status: SHIPPED | OUTPATIENT
Start: 2025-08-21

## 2025-08-21 RX ORDER — TOPIRAMATE 100 MG/1
TABLET, FILM COATED ORAL
Qty: 180 TABLET | Refills: 0 | Status: SHIPPED | OUTPATIENT
Start: 2025-08-21

## (undated) DEVICE — THE BITE BLOCK MAXI, LATEX FREE STRAP IS USED TO PROTECT THE ENDOSCOPE INSERTION TUBE FROM BEING BITTEN BY THE PATIENT.

## (undated) DEVICE — Device: Brand: DEFENDO AIR/WATER/SUCTION AND BIOPSY VALVE

## (undated) DEVICE — Device